# Patient Record
Sex: FEMALE | Race: WHITE | Employment: OTHER | URBAN - METROPOLITAN AREA
[De-identification: names, ages, dates, MRNs, and addresses within clinical notes are randomized per-mention and may not be internally consistent; named-entity substitution may affect disease eponyms.]

---

## 2018-05-21 ENCOUNTER — OFFICE VISIT (OUTPATIENT)
Dept: INTERNAL MEDICINE CLINIC | Age: 83
End: 2018-05-21

## 2018-05-21 VITALS
DIASTOLIC BLOOD PRESSURE: 68 MMHG | OXYGEN SATURATION: 99 % | WEIGHT: 122 LBS | BODY MASS INDEX: 23.03 KG/M2 | RESPIRATION RATE: 18 BRPM | HEART RATE: 67 BPM | TEMPERATURE: 97.4 F | SYSTOLIC BLOOD PRESSURE: 123 MMHG | HEIGHT: 61 IN

## 2018-05-21 DIAGNOSIS — F33.1 MODERATE EPISODE OF RECURRENT MAJOR DEPRESSIVE DISORDER (HCC): ICD-10-CM

## 2018-05-21 DIAGNOSIS — H04.123 DRY EYES: ICD-10-CM

## 2018-05-21 DIAGNOSIS — Z13.5 SCREENING FOR GLAUCOMA: ICD-10-CM

## 2018-05-21 DIAGNOSIS — I10 ESSENTIAL HYPERTENSION: ICD-10-CM

## 2018-05-21 DIAGNOSIS — Z00.00 MEDICARE ANNUAL WELLNESS VISIT, INITIAL: ICD-10-CM

## 2018-05-21 DIAGNOSIS — F03.90 DEMENTIA WITHOUT BEHAVIORAL DISTURBANCE, UNSPECIFIED DEMENTIA TYPE: Primary | ICD-10-CM

## 2018-05-21 RX ORDER — MEMANTINE HYDROCHLORIDE 10 MG/1
10 TABLET ORAL 2 TIMES DAILY
Qty: 180 TAB | Refills: 1 | Status: SHIPPED | OUTPATIENT
Start: 2018-05-21 | End: 2018-12-10 | Stop reason: SDUPTHER

## 2018-05-21 RX ORDER — SERTRALINE HYDROCHLORIDE 25 MG/1
TABLET, FILM COATED ORAL DAILY
COMMUNITY
End: 2018-05-21 | Stop reason: SDUPTHER

## 2018-05-21 RX ORDER — DONEPEZIL HYDROCHLORIDE 5 MG/1
5 TABLET, FILM COATED ORAL
Qty: 90 TAB | Refills: 1 | Status: SHIPPED | OUTPATIENT
Start: 2018-05-21 | End: 2018-09-27 | Stop reason: SDUPTHER

## 2018-05-21 RX ORDER — AMLODIPINE BESYLATE 5 MG/1
5 TABLET ORAL DAILY
Qty: 90 TAB | Refills: 1 | Status: SHIPPED | OUTPATIENT
Start: 2018-05-21 | End: 2019-05-15 | Stop reason: SDUPTHER

## 2018-05-21 RX ORDER — MEMANTINE HYDROCHLORIDE 10 MG/1
TABLET ORAL DAILY
COMMUNITY
End: 2018-05-21 | Stop reason: SDUPTHER

## 2018-05-21 RX ORDER — AMLODIPINE BESYLATE 5 MG/1
5 TABLET ORAL DAILY
COMMUNITY
End: 2018-05-21 | Stop reason: SDUPTHER

## 2018-05-21 RX ORDER — SERTRALINE HYDROCHLORIDE 50 MG/1
50 TABLET, FILM COATED ORAL DAILY
Qty: 90 TAB | Refills: 1 | Status: SHIPPED | OUTPATIENT
Start: 2018-05-21 | End: 2018-11-15 | Stop reason: SDUPTHER

## 2018-05-21 RX ORDER — ASPIRIN 81 MG/1
TABLET ORAL DAILY
COMMUNITY

## 2018-05-21 NOTE — PROGRESS NOTES
History:   Jacob Mccurdy is a 80 y.o. female presenting today for an initial visit for Fall (and unsteady gait); Mass (on hand and another location); Depression (and agitation); Memory Loss (short term memory); and Dehydration (always thirsty and excessive dry eyes)  Establishing care. Lives at the [address] Moved from [place]. 1.  HTN:  On Norvasc 5 mg daily. No symptoms. On low dose ASA. 2.  Depression:  On Zoloft 25 mg daily. Her   last year. Was expected. But had been  61 years. She grieved for him but happy he wasn't suffering anymore. She feels good and adjusting well at the [address]. 3.  Dry eyes: On Restasis BID. Needs referral for local eye doctor. 4.  Dementia:  On Namenda 10 mg BID. Not on Aricept. Has been to see neurologist who started the [address] 5.  SK:  On the left hand. Not bothering her. 6.  Gait:  Has some gait instability. Holds onto railing when uses steps. Has h/o falling. Review of Systems   Constitutional: Negative. HENT: Negative. Eyes:        Dry eyes   Respiratory: Negative. Cardiovascular: Negative. Gastrointestinal: Negative. Genitourinary: Negative. Musculoskeletal: Negative. Skin:        SK on left hand   Neurological: Negative. Psychiatric/Behavioral: Positive for depression. Past Medical History:   Diagnosis Date    Depression     Dry eyes, bilateral     Hypertension        Past Surgical History:   Procedure Laterality Date    HX APPENDECTOMY         Social History     Social History    Marital status:      Spouse name: N/A    Number of children: N/A    Years of education: N/A     Occupational History    Not on file.      Social History Main Topics    Smoking status: Never Smoker    Smokeless tobacco: Never Used    Alcohol use No    Drug use: No    Sexual activity: No     Other Topics Concern    Not on file     Social History Narrative    No narrative on file Family History   Problem Relation Age of Onset    Hypertension Mother     Hypertension Father        No current outpatient prescriptions on file prior to visit. No current facility-administered medications on file prior to visit. No Known Allergies    Objective:   VS:    Visit Vitals    /68 (BP 1 Location: Left arm, BP Patient Position: Sitting)    Pulse 67    Temp 97.4 °F (36.3 °C) (Oral)    Resp 18    Ht 5' 0.5\" (1.537 m)    Wt 122 lb (55.3 kg)    SpO2 99%    BMI 23.43 kg/m2     Physical Exam   Constitutional: She is oriented to person, place, and time. She appears well-developed and well-nourished. No distress. HENT:   Head: Normocephalic and atraumatic. Right Ear: External ear normal.   Left Ear: External ear normal.   Nose: Nose normal.   Mouth/Throat: Oropharynx is clear and moist.   Eyes: Conjunctivae and EOM are normal. Pupils are equal, round, and reactive to light. No scleral icterus. Neck: Normal range of motion. Neck supple. No tracheal deviation present. No thyromegaly present. Cardiovascular: Normal rate, regular rhythm, normal heart sounds and intact distal pulses. Exam reveals no gallop and no friction rub. No murmur heard. Pulmonary/Chest: Effort normal and breath sounds normal. She has no wheezes. She has no rales. Abdominal: Soft. Bowel sounds are normal. She exhibits no distension. There is no hepatosplenomegaly. There is no tenderness. Musculoskeletal: Normal range of motion. She exhibits no edema or tenderness. Lymphadenopathy:     She has no cervical adenopathy. Neurological: She is alert and oriented to person, place, and time. Skin: Skin is warm and dry. No rash noted. No pallor. Psychiatric: She has a normal mood and affect. Her behavior is normal. Judgment and thought content normal.   Nursing note and vitals reviewed. Assessment/ Plan:     Diagnoses and all orders for this visit:    1.  Dementia without behavioral disturbance, unspecified dementia type:  Continue Namenda. Add Aricept. -     memantine (NAMENDA) 10 mg tablet; Take 1 Tab by mouth two (2) times a day. -     donepezil (ARICEPT) 5 mg tablet; Take 1 Tab by mouth nightly. 2. Moderate episode of recurrent major depressive disorder [facility]):  Increase Zoloft to 50 mg daily. -     sertraline (ZOLOFT) 50 mg tablet; Take 1 Tab by mouth daily. 3. Essential hypertension:  Well controlled. -     amLODIPine (NORVASC) 5 mg tablet; Take 1 Tab by mouth daily. 4. Dry eyes  -     REFERRAL TO OPHTHALMOLOGY    5. Screening for glaucoma  -     REFERRAL TO OPHTHALMOLOGY       I have discussed the diagnosis with the patient and the intended plan as seen in the above orders. The patient verbalized understanding and agrees with the plan. Follow-up Disposition:  Return in about 3 months (around 8/21/2018) for HTN, memory loss.     Cinthya Tan MD

## 2018-05-21 NOTE — PROGRESS NOTES
Chief Complaint   Patient presents with    Fall     and unsteady gait    Mass     on hand and another location    Depression     and agitation    Memory Loss     short term memory    Dehydration     always thirsty and excessive dry eyes     1. Have you been to the ER, urgent care clinic since your last visit? Hospitalized since your last visit? No    2. Have you seen or consulted any other health care providers outside of the [address] [place] [address] since your last visit? Include any pap smears or colon screening.  No

## 2018-05-21 NOTE — PROGRESS NOTES
Medicare Wellness Visit  Lety Bell is a 80 y.o. female and presents for annual Medicare Wellness Visit. Problem List: Reviewed with patient and discussed risk factors. There is no problem list on file for this patient. Current medical providers:  No care team member to display    PSH: Reviewed with patient  Past Surgical History:   Procedure Laterality Date    HX APPENDECTOMY          SH: Reviewed with patient  Social History   Substance Use Topics    Smoking status: Never Smoker    Smokeless tobacco: Never Used    Alcohol use No       FH: Reviewed with patient  Family History   Problem Relation Age of Onset    Hypertension Mother     Hypertension Father        Medications/Allergies: Reviewed with patient  No current outpatient prescriptions on file prior to visit. No current facility-administered medications on file prior to visit. No Known Allergies    Objective: There were no vitals taken for this visit. There is no height or weight on file to calculate BMI. Assessment of cognitive impairment: Alert and oriented x 3    Depression Screen:   PHQ over the last two weeks 5/21/2018   Little interest or pleasure in doing things Several days   Feeling down, depressed or hopeless Several days   Total Score PHQ 2 2       Fall Risk Assessment:    Fall Risk Assessment, last 12 mths 5/21/2018   Able to walk? Yes   Fall in past 12 months? Yes   Fall with injury? No   Number of falls in past 12 months 1   Fall Risk Score 1       Functional Ability:   Does the patient exhibit a steady gait?  no   How long did it take the patient to get up and walk from a sitting position? <10 sec   Is the patient self reliant?  (ie can do own laundry, meals, household chores)  yes     Does the patient handle his/her own medications? yes     Does the patient handle his/her own money? yes     Is the patients home safe (ie good lighting, handrails on stairs and bath, etc.)?    yes     Did you notice or did patient express any hearing difficulties? no     Did you notice or did patient express any vision difficulties? yes     Were distance and reading eye charts used? yes       Advance Care Planning:   Patient was offered the opportunity to discuss advance care planning:  yes     Does patient have an Advance Directive:  yes   If no, did you provide information on Caring Connections?  no       Plan:      Orders Placed This Encounter    amLODIPine (NORVASC) 5 mg tablet    memantine (NAMENDA) 10 mg tablet    sertraline (ZOLOFT) 25 mg tablet    aspirin delayed-release 81 mg tablet     1. Sending to eye doctor. 2.  Aged out of colonoscopy, mammograms  3. Had DEXA in the past.  Doesn't remember when. 4.  Had shingles vaccine. Not sure about pneumonia. Health Maintenance   Topic Date Due    DTaP/Tdap/Td series (1 - Tdap) 09/25/1950    ZOSTER VACCINE AGE 60>  07/25/1989    GLAUCOMA SCREENING Q2Y  09/25/1994    Bone Densitometry (Dexa) Screening  09/25/1994    Pneumococcal 65+ Low/Medium Risk (1 of 2 - PCV13) 09/25/1994    Influenza Age 5 to Adult  08/01/2018       *Patient verbalized understanding and agreement with the plan. A copy of the After Visit Summary with personalized health plan was given to the patient today.

## 2018-05-21 NOTE — MR AVS SNAPSHOT
[address] [facility] [address] [place] [address] 
[phone] Patient: Sonia Crain MRN: [id number] MU Visit Information Date & Time Provider Department Dept. Phone Encounter #  
 2018  1:30 PM Lilo Monique MD [facility] [place] [phone] Follow-up Instructions Return in about 3 months (around 2018) for HTN, memory loss. Upcoming Health Maintenance Date Due DTaP/Tdap/Td series (1 - Tdap) 1950 ZOSTER VACCINE AGE 60> 1989 GLAUCOMA SCREENING Q2Y 1994 Bone Densitometry (Dexa) Screening 1994 Pneumococcal 65+ Low/Medium Risk (1 of 2 - PCV13) 1994 Influenza Age 5 to Adult 2018 Allergies as of 2018  Review Complete On: 2018 By: Lilo Monique MD  
 No Known Allergies Current Immunizations  Never Reviewed No immunizations on file. Not reviewed this visit You Were Diagnosed With   
  
 Codes Comments Dementia without behavioral disturbance, unspecified dementia type    -  Primary ICD-10-CM: F03.90 ICD-9-CM: 294.20 Moderate episode of recurrent major depressive disorder (HCC)     ICD-10-CM: F33.1 ICD-9-CM: 296.32 Essential hypertension     ICD-10-CM: I10 
ICD-9-CM: 401.9 Medicare annual wellness visit, initial     ICD-10-CM: Z00.00 ICD-9-CM: V70.0 Vitals Smoking Status Never Smoker Preferred Pharmacy Pharmacy Name Phone [facility] [address] [place] [address] [phone] Your Updated Medication List  
  
   
This list is accurate as of 18  2:16 PM.  Always use your most recent med list. amLODIPine 5 mg tablet Commonly known as:  Ramiro Palomino Take 1 Tab by mouth daily. aspirin delayed-release 81 mg tablet Take  by mouth daily. donepezil 5 mg tablet Commonly known as:  ARICEPT  
 Take 1 Tab by mouth nightly. memantine 10 mg tablet Commonly known as:  Vernona Other Take 1 Tab by mouth two (2) times a day. sertraline 50 mg tablet Commonly known as:  ZOLOFT Take 1 Tab by mouth daily. Prescriptions Sent to Pharmacy Refills  
 amLODIPine (NORVASC) 5 mg tablet 1 Sig: Take 1 Tab by mouth daily. Class: Normal  
 Pharmacy: [facility]/pharmacy #[id number] [address] Ph #: [phone] Route: Oral  
 memantine (NAMENDA) 10 mg tablet 1 Sig: Take 1 Tab by mouth two (2) times a day. Class: Normal  
 Pharmacy: [facility]/pharmacy #[id number]. Ph #: [phone] Route: Oral  
 sertraline (ZOLOFT) 50 mg tablet 1 Sig: Take 1 Tab by mouth daily. Class: Normal  
 Pharmacy: [facility]/pharmacy #[id number]. Ph #: [phone] Route: Oral  
 donepezil (ARICEPT) 5 mg tablet 1 Sig: Take 1 Tab by mouth nightly. Class: Normal  
 Pharmacy: [facility]/pharmacy #[id number]. Ph #: [phone] Route: Oral  
  
Follow-up Instructions Return in about 3 months (around 8/21/2018) for HTN, memory loss. Patient Instructions Schedule of Personalized Health Plan (Provide Copy to Patient) The best way to stay healthy is to live a healthy lifestyle. A healthy lifestyle includes regular exercise, eating a well-balanced diet, keeping a healthy weight and not smoking. Regular physical exams and screening tests are another important way to take care of yourself. Preventive exams provided by health care providers can find health problems early when treatment works best and can keep you from getting certain diseases or illnesses. Preventive services include exams, lab tests, screenings, shots, monitoring and information to help you take care of your own health. All people over 65 should have a pneumonia shot.  Pneumonia shots are usually only needed once in a lifetime unless your doctor decides differently. All people over 65 should have a yearly flu shot. People over 65 are at medium to high risk for Hepatitis B. Three shots are needed for complete protection. In addition to your physical exam, some screening tests are recommended: 
 
Bone mass measurement (dexa scan) is recommended every two years Diabetes Mellitus screening is recommended every year. Glaucoma is an eye disease caused by high pressure in the eye. An eye exam is recommended every year. Cardiovascular screening tests that check your cholesterol and other blood fat (lipid) levels are recommended every five years. Colorectal Cancer screening tests help to find pre-cancerous polyps (growths in the colon) so they can be removed before they turn into cancer. Tests ordered for screening depend on your personal and family history risk factors. Screening for Breast Cancer is recommended yearly with a mammogram. 
 
Screening for Cervical Cancer is recommended every two years (annually for certain risk factors, such as previous history of STD or abnormal PAP in past 7 years), with a Pelvic Exam with PAP Here is a list of your current Health Maintenance items with a due date: 
Health Maintenance Topic Date Due  
 DTaP/Tdap/Td series (1 - Tdap) 09/25/1950  ZOSTER VACCINE AGE 60>  07/25/1989  GLAUCOMA SCREENING Q2Y  09/25/1994  Bone Densitometry (Dexa) Screening  09/25/1994  Pneumococcal 65+ Low/Medium Risk (1 of 2 - PCV13) 09/25/1994  Influenza Age 5 to Adult  08/01/2018 Introducing [facility] & HEALTH SERVICES! [facility] introduces [employer] patient portal. Now you can access parts of your medical record, email your doctor's office, and request medication refills online. 1. In your internet browser, go to [email or web address] 2. Click on the First Time User? Click Here link in the Sign In box.  You will see the New Member Sign Up page. 3. Enter your [employer] Access Code exactly as it appears below. You will not need to use this code after youve completed the sign-up process. If you do not sign up before the expiration date, you must request a new code. · [employer] Access Code: [email or web address] Expires: 8/19/2018  1:55 PM 
 
4. Enter the last four digits of your Social Security Number (xxxx) and Date of Birth (mm/dd/yyyy) as indicated and click Submit. You will be taken to the next sign-up page. 5. Create a CargoSpottert ID. This will be your [employer] login ID and cannot be changed, so think of one that is secure and easy to remember. 6. Create a [employer] password. You can change your password at any time. 7. Enter your Password Reset Question and Answer. This can be used at a later time if you forget your password. 8. Enter your e-mail address. You will receive e-mail notification when new information is available in [address] 9. Click Sign Up. You can now view and download portions of your medical record. 10. Click the Download Summary menu link to download a portable copy of your medical information. If you have questions, please visit the Frequently Asked Questions section of the [employer] website. Remember, [employer] is NOT to be used for urgent needs. For medical emergencies, dial 911. Now available from your iPhone and Android! Please provide this summary of care documentation to your next provider. If you have any questions after today's visit, please call [phone].

## 2018-05-21 NOTE — PATIENT INSTRUCTIONS
Schedule of Personalized Health Plan  (Provide Copy to Patient)  The best way to stay healthy is to live a healthy lifestyle. A healthy lifestyle includes regular exercise, eating a well-balanced diet, keeping a healthy weight and not smoking. Regular physical exams and screening tests are another important way to take care of yourself. Preventive exams provided by health care providers can find health problems early when treatment works best and can keep you from getting certain diseases or illnesses. Preventive services include exams, lab tests, screenings, shots, monitoring and information to help you take care of your own health. All people over 65 should have a pneumonia shot. Pneumonia shots are usually only needed once in a lifetime unless your doctor decides differently. All people over 65 should have a yearly flu shot. People over 65 are at medium to high risk for Hepatitis B. Three shots are needed for complete protection. In addition to your physical exam, some screening tests are recommended:    Bone mass measurement (dexa scan) is recommended every two years  Diabetes Mellitus screening is recommended every year. Glaucoma is an eye disease caused by high pressure in the eye. An eye exam is recommended every year. Cardiovascular screening tests that check your cholesterol and other blood fat (lipid) levels are recommended every five years. Colorectal Cancer screening tests help to find pre-cancerous polyps (growths in the colon) so they can be removed before they turn into cancer. Tests ordered for screening depend on your personal and family history risk factors.     Screening for Breast Cancer is recommended yearly with a mammogram.    Screening for Cervical Cancer is recommended every two years (annually for certain risk factors, such as previous history of STD or abnormal PAP in past 7 years), with a Pelvic Exam with PAP    Here is a list of your current Health Maintenance items with a due date:  Health Maintenance   Topic Date Due    DTaP/Tdap/Td series (1 - Tdap) 09/25/1950    ZOSTER VACCINE AGE 60>  07/25/1989    GLAUCOMA SCREENING Q2Y  09/25/1994    Bone Densitometry (Dexa) Screening  09/25/1994    Pneumococcal 65+ Low/Medium Risk (1 of 2 - PCV13) 09/25/1994    Influenza Age 9 to Adult  08/01/2018

## 2018-06-11 ENCOUNTER — DOCUMENTATION ONLY (OUTPATIENT)
Dept: INTERNAL MEDICINE CLINIC | Age: 83
End: 2018-06-11

## 2018-07-23 ENCOUNTER — OFFICE VISIT (OUTPATIENT)
Dept: INTERNAL MEDICINE CLINIC | Age: 83
End: 2018-07-23

## 2018-07-23 VITALS
OXYGEN SATURATION: 98 % | BODY MASS INDEX: 23.75 KG/M2 | WEIGHT: 121 LBS | DIASTOLIC BLOOD PRESSURE: 58 MMHG | SYSTOLIC BLOOD PRESSURE: 102 MMHG | RESPIRATION RATE: 18 BRPM | HEART RATE: 65 BPM | HEIGHT: 60 IN | TEMPERATURE: 97.9 F

## 2018-07-23 DIAGNOSIS — J01.00 ACUTE NON-RECURRENT MAXILLARY SINUSITIS: Primary | ICD-10-CM

## 2018-07-23 DIAGNOSIS — N30.00 ACUTE CYSTITIS WITHOUT HEMATURIA: ICD-10-CM

## 2018-07-23 LAB
BILIRUB UR QL STRIP: NORMAL
GLUCOSE UR-MCNC: NEGATIVE MG/DL
KETONES P FAST UR STRIP-MCNC: NORMAL MG/DL
PH UR STRIP: 5.5 [PH] (ref 4.6–8)
PROT UR QL STRIP: NORMAL
SP GR UR STRIP: 1.03 (ref 1–1.03)
UA UROBILINOGEN AMB POC: NORMAL (ref 0.2–1)
URINALYSIS CLARITY POC: NORMAL
URINALYSIS COLOR POC: NORMAL
URINE BLOOD POC: NORMAL
URINE LEUKOCYTES POC: NORMAL
URINE NITRITES POC: NEGATIVE

## 2018-07-23 RX ORDER — CEFUROXIME AXETIL 250 MG/1
250 TABLET ORAL 2 TIMES DAILY
Qty: 14 TAB | Refills: 0 | Status: SHIPPED | OUTPATIENT
Start: 2018-07-23 | End: 2018-07-30

## 2018-07-23 NOTE — MR AVS SNAPSHOT
[address] [facility] 
 
 
 Shai Cheng [address] [facility] [address] 
[phone] Patient: Stacie Owens MRN: [id number] Visit Information Date & Time Provider Department Dept. Phone Encounter #  
 7/23/2018  2:45 PM Rupinder Soliz MD [facility] [phone] [id number] Follow-up Instructions Return if symptoms worsen or fail to improve. Your Appointments 8/23/2018  1:15 PM  
Office Visit with Rupinder Soliz MD  
[facility]) Appt Note: 3 months (around 8/21/2018) for HTN, memory loss. [address] [facility] [address] [phone]  
  
   
 Shawn Paredes [facility] [address] [facility] [address]. [place] [address] Upcoming Health Maintenance Date Due DTaP/Tdap/Td series (1 - Tdap) 9[birth year]25/1950 GLAUCOMA SCREENING Q2Y 9/25/1994 Influenza Age 5 to Adult 8/1/2018 Allergies as of 7/23/2018  Review Complete On: 7/23/2018 By: Jessica Lemos No Known Allergies Current Immunizations  Reviewed on 6/25/2018 Name Date Influenza High Dose Vaccine PF 10/28/2016 Pneumococcal Conjugate (PCV-13) 7/22/2015 Pneumococcal Polysaccharide (PPSV-23) 6/10/2010 Zoster Vaccine, Live 6/19/2012 Not reviewed this visit You Were Diagnosed With   
  
 Codes Comments Acute non-recurrent maxillary sinusitis    -  Primary ICD-10-CM: J01.00 ICD-9-CM: 461.0 Acute cystitis without hematuria     ICD-10-CM: N30.00 ICD-9-CM: 595.0 Vitals BP Pulse Temp Resp Height(growth percentile) Weight(growth percentile) 102/58 (BP 1 Location: Left arm, BP Patient Position: Sitting) 65 97.9 °F (36.6 °C) (Oral) 18 5' (1.524 m) 121 lb (54.9 kg) SpO2 BMI OB Status Smoking Status 98% 23.63 kg/m2 Unknown Never Smoker BMI and BSA Data Body Mass Index Body Surface Area  
 23.63 kg/m 2 1.52 m 2 Preferred Pharmacy Pharmacy Name Phone [facility] [address] [facility] [phone] Your Updated Medication List  
  
   
This list is accurate as of 7/23/18  3:24 PM.  Always use your most recent med list. amLODIPine 5 mg tablet Commonly known as:  Castro Jules Take 1 Tab by mouth daily. aspirin delayed-release 81 mg tablet Take  by mouth daily. cefUROXime 250 mg tablet Commonly known as:  CEFTIN Take 1 Tab by mouth two (2) times a day for 7 days. donepezil 5 mg tablet Commonly known as:  ARICEPT Take 1 Tab by mouth nightly. memantine 10 mg tablet Commonly known as:  Merrilyn Ree Take 1 Tab by mouth two (2) times a day. sertraline 50 mg tablet Commonly known as:  ZOLOFT Take 1 Tab by mouth daily. Prescriptions Sent to Pharmacy Refills  
 cefUROXime (CEFTIN) 250 mg tablet 0 Sig: Take 1 Tab by mouth two (2) times a day for 7 days. Class: Normal  
 Pharm[id number]. Ph #: [phone] Route: Oral  
  
Follow-up Instructions Return if symptoms worsen or fail to improve. Introducing [facility] & HEALTH SERVICES! Ender Brewer introduces [employer] patient portal. Now you can access parts of your medical record, email your doctor's office, and request medication refills online. 1. In your internet browser, go to [email or web address] 2. Click on the First Time User? Click Here link in the Sign In box. You will see the New Member Sign Up page. 3. Enter your [employer] Access Code exactly as it appears below. You will not need to use this code after youve completed the sign-up process. If you do not sign up before the expiration date, you must request a new code. · [employer] Access Code: [email or web address] Expires: 8/19/2018  1:55 PM 
 
4.  Enter the last four digits of your Social Security Number (xxxx) and Date of Birth (mm/dd/yyyy) as indicated and click Submit. You will be taken to the next sign-up page. 5. Create a [employer] ID. This will be your [employer] login ID and cannot be changed, so think of one that is secure and easy to remember. 6. Create a [employer] password. You can change your password at any time. 7. Enter your Password Reset Question and Answer. This can be used at a later time if you forget your password. 8. Enter your e-mail address. You will receive e-mail notification when new information is available in [address] 9. Click Sign Up. You can now view and download portions of your medical record. 10. Click the Download Summary menu link to download a portable copy of your medical information. If you have questions, please visit the Frequently Asked Questions section of the [employer] website. Remember, [employer] is NOT to be used for urgent needs. For medical emergencies, dial 911. Now available from your iPhone and Android! Please provide this summary of care documentation to your next provider. If you have any questions after today's visit, please call [phone].

## 2018-07-23 NOTE — PROGRESS NOTES
Chief Complaint   Patient presents with    Blood in Urine    Cough     and congestion     1. Have you been to the ER, urgent care clinic since your last visit? Hospitalized since your last visit? No    2. Have you seen or consulted any other health care providers outside of the [address] [place] [address] since your last visit? Include any pap smears or colon screening.  No

## 2018-07-23 NOTE — PROGRESS NOTES
Subjective:   Patient is a 80y.o. year old female who presents for Blood in Urine and Cough (and congestion)  She says she feels very bad. She's feeling weak, shaky, having unstable gait. She has a little cough and some rhinorrhea. She says her eyes are bothering her. She says they have been red. Possibly some vision issues, with difficulty reading. Maybe some D/C from the eyes. A little weakness and dizziness. Having trouble sleeping and vivid dreams. Denies dysuria, frequency. Denies abdominal pain but feels uncomfortable. No N/V/diarrhea. Has constipation but that's chronic. Last BM yesterday. She's able to eat and drink today. She says she had similar symptoms 9 years ago. She developed urinary frequency and then passed out. She went to the hospital.  She had a small bowel obstruction. Had to have surgery. Review of Systems   Constitutional: Positive for malaise/fatigue. Negative for fever. HENT: Positive for congestion and sinus pain. Negative for sore throat. Respiratory: Positive for cough. Negative for sputum production and shortness of breath. Cardiovascular: Negative. Genitourinary: Negative for dysuria. Neurological: Positive for weakness. Current Outpatient Prescriptions on File Prior to Visit   Medication Sig Dispense Refill    aspirin delayed-release 81 mg tablet Take  by mouth daily.  amLODIPine (NORVASC) 5 mg tablet Take 1 Tab by mouth daily. 90 Tab 1    memantine (NAMENDA) 10 mg tablet Take 1 Tab by mouth two (2) times a day. 180 Tab 1    sertraline (ZOLOFT) 50 mg tablet Take 1 Tab by mouth daily. 90 Tab 1    donepezil (ARICEPT) 5 mg tablet Take 1 Tab by mouth nightly. 90 Tab 1     No current facility-administered medications on file prior to visit. Reviewed PmHx, RxHx, FmHx, SocHx, AllgHx and updated and dated in the chart.     Nurse notes were reviewed and are correct    Objective:     Vitals:    07/23/18 1452   BP: 102/58   Pulse: 65 Resp: 18   Temp: 97.9 °F (36.6 °C)   TempSrc: Oral   SpO2: 98%   Weight: 121 lb (54.9 kg)   Height: 5' (1.524 m)     Physical Exam   Constitutional: She is oriented to person, place, and time. She appears well-developed and well-nourished. No distress. HENT:   Head: Normocephalic and atraumatic. Right Ear: External ear normal.   Left Ear: External ear normal.   Nose: Nose normal.   Mouth/Throat: Oropharynx is clear and moist.   Eyes: EOM are normal. Pupils are equal, round, and reactive to light. Neck: Normal range of motion. Neck supple. Carotid bruit is not present. No tracheal deviation present. Cardiovascular: Normal rate, regular rhythm, normal heart sounds and intact distal pulses. Exam reveals no gallop and no friction rub. No murmur heard. Pulmonary/Chest: Effort normal and breath sounds normal. She has no wheezes. She has no rales. Abdominal: Soft. Bowel sounds are normal. She exhibits no distension. There is no tenderness. Musculoskeletal: She exhibits no edema or tenderness. Lymphadenopathy:     She has no cervical adenopathy. Neurological: She is alert and oriented to person, place, and time. Skin: Skin is warm and dry. Psychiatric: She has a normal mood and affect. Her behavior is normal.   Nursing note and vitals reviewed. U/A:  1+ leuk. Trace blood. Neg nitrites. 1+ ketones. Assessment/ Plan:     Diagnoses and all orders for this visit:    1. Acute non-recurrent maxillary sinusitis:  Most of her symptoms are related to URI, and since she's feeling quite sick, will treat with abx. Treating with Ceftin to cover this and possible UTI. Giving lower dose due to advanced age. Told her and her daughter she needs to stay well hydrated and if getting more weak, worse MS, fever, or worse hydration, then needs to go to ED for further care. Discussed getting labs and CXR but lungs clear on exam and only mild respiratory symptoms.   Since treating with abx anyway, did not get further testing at this time. -     cefUROXime (CEFTIN) 250 mg tablet; Take 1 Tab by mouth two (2) times a day for 7 days. 2. Acute cystitis without hematuria:  Questionable based on the U/A. Not enough urine to send for cx. Will treat with abx that will cover this. -     cefUROXime (CEFTIN) 250 mg tablet; Take 1 Tab by mouth two (2) times a day for 7 days.  -     AMB POC URINALYSIS DIP STICK MANUAL W/O MICRO     I have discussed the diagnosis with the patient and the intended plan as seen in the above orders. The patient verbalized understanding and agrees with the plan. Follow-up Disposition:  Return if symptoms worsen or fail to improve.     Xavi Sanchez MD

## 2018-09-27 ENCOUNTER — OFFICE VISIT (OUTPATIENT)
Dept: INTERNAL MEDICINE CLINIC | Age: 83
End: 2018-09-27

## 2018-09-27 VITALS
WEIGHT: 125 LBS | OXYGEN SATURATION: 96 % | HEIGHT: 60 IN | TEMPERATURE: 97.5 F | SYSTOLIC BLOOD PRESSURE: 135 MMHG | HEART RATE: 72 BPM | DIASTOLIC BLOOD PRESSURE: 81 MMHG | BODY MASS INDEX: 24.54 KG/M2 | RESPIRATION RATE: 18 BRPM

## 2018-09-27 DIAGNOSIS — F03.90 DEMENTIA WITHOUT BEHAVIORAL DISTURBANCE, UNSPECIFIED DEMENTIA TYPE: ICD-10-CM

## 2018-09-27 DIAGNOSIS — I10 ESSENTIAL HYPERTENSION: Primary | ICD-10-CM

## 2018-09-27 DIAGNOSIS — Z13.6 SCREENING, ISCHEMIC HEART DISEASE: ICD-10-CM

## 2018-09-27 DIAGNOSIS — F33.1 MODERATE EPISODE OF RECURRENT MAJOR DEPRESSIVE DISORDER (HCC): ICD-10-CM

## 2018-09-27 RX ORDER — DONEPEZIL HYDROCHLORIDE 10 MG/1
5 TABLET, FILM COATED ORAL
Qty: 90 TAB | Refills: 1 | Status: SHIPPED | OUTPATIENT
Start: 2018-09-27 | End: 2018-09-27 | Stop reason: SDUPTHER

## 2018-09-27 RX ORDER — DONEPEZIL HYDROCHLORIDE 10 MG/1
10 TABLET, FILM COATED ORAL
Qty: 90 TAB | Refills: 1 | Status: SHIPPED | OUTPATIENT
Start: 2018-09-27 | End: 2019-05-01 | Stop reason: SDUPTHER

## 2018-09-27 NOTE — PROGRESS NOTES
Subjective:  
Patient is a 80y.o. year old female who presents for Hypertension and Memory Loss 1. HTN:  BP well controlled on Norvasc. On ASA low dose as well. 2.  Memory loss: On Namenda, Aricept. Still having memory problems. 3.  Depression:  On Zoloft 50 mg. Doing well. [facility] Upset about being there because there is a big ant problem there. They are not helping her with that. Review of Systems Constitutional: Negative. Cardiovascular: Negative. Current Outpatient Prescriptions on File Prior to Visit Medication Sig Dispense Refill  aspirin delayed-release 81 mg tablet Take  by mouth daily.  amLODIPine (NORVASC) 5 mg tablet Take 1 Tab by mouth daily. 90 Tab 1  
 memantine (NAMENDA) 10 mg tablet Take 1 Tab by mouth two (2) times a day. 180 Tab 1  
 sertraline (ZOLOFT) 50 mg tablet Take 1 Tab by mouth daily. 90 Tab 1 No current facility-administered medications on file prior to visit. Reviewed PmHx, RxHx, FmHx, SocHx, AllgHx and updated and dated in the chart. Nurse notes were reviewed and are correct Objective:  
 
Vitals:  
 09/27/18 1053 BP: 135/81 Pulse: 72 Resp: 18 Temp: 97.5 °F (36.4 °C) TempSrc: Oral  
SpO2: 96% Weight: 125 lb (56.7 kg) Height: 5' (1.524 m) Physical Exam  
Constitutional: She is oriented to person, place, and time. She appears well-developed and well-nourished. No distress. HENT:  
Head: Normocephalic and atraumatic. Right Ear: External ear normal.  
Left Ear: External ear normal.  
Nose: Nose normal.  
Mouth/Throat: Oropharynx is clear and moist.  
Eyes: EOM are normal. Pupils are equal, round, and reactive to light. Neck: Normal range of motion. Neck supple. Carotid bruit is not present. No tracheal deviation present. Cardiovascular: Normal rate, regular rhythm, normal heart sounds and intact distal pulses. Exam reveals no gallop and no friction rub. No murmur heard. Pulmonary/Chest: Effort normal and breath sounds normal. She has no wheezes. She has no rales. Abdominal: Soft. Bowel sounds are normal. She exhibits no distension. There is no tenderness. Musculoskeletal: She exhibits no edema or tenderness. Lymphadenopathy:  
  She has no cervical adenopathy. Neurological: She is alert and oriented to person, place, and time. Skin: Skin is warm and dry. Psychiatric: She has a normal mood and affect. Her behavior is normal.  
Nursing note and vitals reviewed. Assessment/ Plan:  
 
Diagnoses and all orders for this visit: 1. Essential hypertension 
-     CBC WITH AUTOMATED DIFF; Future 
-     TSH 3RD GENERATION; Future -     METABOLIC PANEL, COMPREHENSIVE; Future 2. Dementia without behavioral disturbance, unspecified dementia type: Increasing Aricept to 10 mg daily 
-     TSH 3RD GENERATION; Future 
-     donepezil (ARICEPT) 10 mg tablet; Take 1 Tab by mouth nightly. 3. Moderate episode of recurrent major depressive disorder ([facility] [address]): Controlled. Continue Celexa 4. Screening, ischemic heart disease:  Not fasting. 
-     LIPID PANEL; Future I have discussed the diagnosis with the patient and the intended plan as seen in the above orders. The patient verbalized understanding and agrees with the plan. Follow-up Disposition: 
Return in about 6 months (around 3/27/2019) for HTN, dementia, depression. Marty Lee MD'

## 2018-09-27 NOTE — MR AVS SNAPSHOT
[address] [facility] [address] [place] [address] 
[phone] Patient: Neyda Blackmon MRN: [id number] WBO:[id number] Visit Information Date & Time Provider Department Dept. Phone Encounter #  
 9/27/2018 10:45 AM Cande Horan MD [id number] [phone] [id number] Follow-up Instructions Return in about 6 months (around 3/27/2019) for HTN, dementia, depression. Upcoming Health Maintenance Date Due DTaP/Tdap/Td series (1 - Tdap) 9/25/1950 Shingrix Vaccine Age 50> (1 of 2) 9/25/1979 Influenza Age 5 to Adult 8/1/2018 GLAUCOMA SCREENING Q2Y 6/15/2020 Allergies as of 9/27/2018  Review Complete On: 9/27/2018 By: Cande Horan MD  
 No Known Allergies Current Immunizations  Reviewed on 6/25/2018 Name Date Influenza High Dose Vaccine PF 10/28/2016 Pneumococcal Conjugate (PCV-13) 7/22/2015 Pneumococcal Polysaccharide (PPSV-23) 6/10/2010 Zoster Vaccine, Live 6/19/2012 Not reviewed this visit You Were Diagnosed With   
  
 Codes Comments Essential hypertension    -  Primary ICD-10-CM: I10 
ICD-9-CM: 401.9 Dementia without behavioral disturbance, unspecified dementia type     ICD-10-CM: F03.90 ICD-9-CM: 294.20 Moderate episode of recurrent major depressive disorder (HCC)     ICD-10-CM: F33.1 ICD-9-CM: 296.32 Screening, ischemic heart disease     ICD-10-CM: Z13.6 ICD-9-CM: V81.0 Vitals BP Pulse Temp Resp Height(growth percentile) Weight(growth percentile) 135/81 (BP 1 Location: Left arm, BP Patient Position: Sitting) 72 97.5 °F (36.4 °C) (Oral) 18 5' (1.524 m) 125 lb (56.7 kg) SpO2 BMI OB Status Smoking Status 96% 24.41 kg/m2 Unknown Never Smoker BMI and BSA Data Body Mass Index Body Surface Area  
 24.41 kg/m 2 1.55 m 2 Preferred Pharmacy Pharmacy Name Phone [facility] [address] [phone] Your Updated Medication List  
  
   
This list is accurate as of 9/27/18 11:17 AM.  Always use your most recent med list. amLODIPine 5 mg tablet Commonly known as:  Voncile Graford Take 1 Tab by mouth daily. aspirin delayed-release 81 mg tablet Take  by mouth daily. donepezil 10 mg tablet Commonly known as:  ARICEPT Take 0.5 Tabs by mouth nightly. memantine 10 mg tablet Commonly known as:  Amarjit Sania Take 1 Tab by mouth two (2) times a day. sertraline 50 mg tablet Commonly known as:  ZOLOFT Take 1 Tab by mouth daily. Prescriptions Sent to Pharmacy Refills  
 donepezil (ARICEPT) 10 mg tablet 1 Sig: Take 0.5 Tabs by mouth nightly. Class: Normal  
 Pharmacy: [facility]/pharmacy #[id number].  #: [phone] Route: Oral  
  
Follow-up Instructions Return in about 6 months (around 3/27/2019) for HTN, dementia, depression. To-Do List   
 09/27/2018 Lab:  CBC WITH AUTOMATED DIFF   
  
 09/27/2018 Lab:  LIPID PANEL   
  
 09/27/2018 Lab:  METABOLIC PANEL, COMPREHENSIVE   
  
 09/27/2018 Lab:  TSH 3RD GENERATION Introducing [facility]! Marlena Horton introduces Wiper patient portal. Now you can access parts of your medical record, email your doctor's office, and request medication refills online. 1. In your internet browser, go to [email or web address] 2. Click on the First Time User? Click Here link in the Sign In box. You will see the New Member Sign Up page. 3. Enter your Wiper Access Code exactly as it appears below. You will not need to use this code after youve completed the sign-up process. If you do not sign up before the expiration date, you must request a new code. · Wiper Access Code: [email or web address] Expires: 12/26/2018 10:50 AM 
 
 4. Enter the last four digits of your Social Security Number (xxxx) and Date of Birth (mm/dd/yyyy) as indicated and click Submit. You will be taken to the next sign-up page. 5. Create a Taigen ID. This will be your Taigen login ID and cannot be changed, so think of one that is secure and easy to remember. 6. Create a Taigen password. You can change your password at any time. 7. Enter your Password Reset Question and Answer. This can be used at a later time if you forget your password. 8. Enter your e-mail address. You will receive e-mail notification when new information is available in [address] 9. Click Sign Up. You can now view and download portions of your medical record. 10. Click the Download Summary menu link to download a portable copy of your medical information. If you have questions, please visit the Frequently Asked Questions section of the Taigen website. Remember, Taigen is NOT to be used for urgent needs. For medical emergencies, dial 911. Now available from your iPhone and Android! Please provide this summary of care documentation to your next provider. If you have any questions after today's visit, please call [phone].

## 2018-09-27 NOTE — PROGRESS NOTES
Chief Complaint Patient presents with  Hypertension  Memory Loss 1. Have you been to the ER, urgent care clinic since your last visit? Hospitalized since your last visit? No 
 
2. Have you seen or consulted any other health care providers outside of the [facility] since your last visit? Include any pap smears or colon screening.  No

## 2018-09-28 LAB
ALBUMIN SERPL-MCNC: 4.2 G/DL (ref 3.5–4.7)
ALBUMIN/GLOB SERPL: 2.2 {RATIO} (ref 1.2–2.2)
ALP SERPL-CCNC: 162 IU/L (ref 39–117)
ALT SERPL-CCNC: 11 IU/L (ref 0–32)
AST SERPL-CCNC: 18 IU/L (ref 0–40)
BASOPHILS # BLD AUTO: 0.1 X10E3/UL (ref 0–0.2)
BASOPHILS NFR BLD AUTO: 1 %
BILIRUB SERPL-MCNC: 0.4 MG/DL (ref 0–1.2)
BUN SERPL-MCNC: 25 MG/DL (ref 8–27)
BUN/CREAT SERPL: 22 (ref 12–28)
CALCIUM SERPL-MCNC: 9.6 MG/DL (ref 8.7–10.3)
CHLORIDE SERPL-SCNC: 105 MMOL/L (ref 96–106)
CHOLEST SERPL-MCNC: 200 MG/DL (ref 100–199)
CO2 SERPL-SCNC: 23 MMOL/L (ref 20–29)
CREAT SERPL-MCNC: 1.12 MG/DL (ref 0.57–1)
EOSINOPHIL # BLD AUTO: 0.2 X10E3/UL (ref 0–0.4)
EOSINOPHIL NFR BLD AUTO: 3 %
ERYTHROCYTE [DISTWIDTH] IN BLOOD BY AUTOMATED COUNT: 15.7 % (ref 12.3–15.4)
GLOBULIN SER CALC-MCNC: 1.9 G/DL (ref 1.5–4.5)
GLUCOSE SERPL-MCNC: 83 MG/DL (ref 65–99)
HCT VFR BLD AUTO: 44.5 % (ref 34–46.6)
HDLC SERPL-MCNC: 79 MG/DL
HGB BLD-MCNC: 14.3 G/DL (ref 11.1–15.9)
IMM GRANULOCYTES # BLD: 0 X10E3/UL (ref 0–0.1)
IMM GRANULOCYTES NFR BLD: 0 %
LDLC SERPL CALC-MCNC: 108 MG/DL (ref 0–99)
LYMPHOCYTES # BLD AUTO: 1.5 X10E3/UL (ref 0.7–3.1)
LYMPHOCYTES NFR BLD AUTO: 23 %
MCH RBC QN AUTO: 29.9 PG (ref 26.6–33)
MCHC RBC AUTO-ENTMCNC: 32.1 G/DL (ref 31.5–35.7)
MCV RBC AUTO: 93 FL (ref 79–97)
MONOCYTES # BLD AUTO: 0.5 X10E3/UL (ref 0.1–0.9)
MONOCYTES NFR BLD AUTO: 8 %
NEUTROPHILS # BLD AUTO: 4.2 X10E3/UL (ref 1.4–7)
NEUTROPHILS NFR BLD AUTO: 65 %
PLATELET # BLD AUTO: 228 X10E3/UL (ref 150–379)
POTASSIUM SERPL-SCNC: 4.7 MMOL/L (ref 3.5–5.2)
PROT SERPL-MCNC: 6.1 G/DL (ref 6–8.5)
RBC # BLD AUTO: 4.78 X10E6/UL (ref 3.77–5.28)
SODIUM SERPL-SCNC: 145 MMOL/L (ref 134–144)
TRIGL SERPL-MCNC: 66 MG/DL (ref 0–149)
TSH SERPL DL<=0.005 MIU/L-ACNC: 0.78 UIU/ML (ref 0.45–4.5)
VLDLC SERPL CALC-MCNC: 13 MG/DL (ref 5–40)
WBC # BLD AUTO: 6.5 X10E3/UL (ref 3.4–10.8)

## 2018-10-01 NOTE — PROGRESS NOTES
Let her or her daughter know that her blood work looked fine.   Everything normal except kidney function a little decreased but I compared to old labs and has been stable for the last 4-5 years, so nothing needs to be done other than avoiding NSAIDs if possible (motrine, aleve)

## 2018-12-10 DIAGNOSIS — F03.90 DEMENTIA WITHOUT BEHAVIORAL DISTURBANCE, UNSPECIFIED DEMENTIA TYPE: ICD-10-CM

## 2018-12-13 RX ORDER — MEMANTINE HYDROCHLORIDE 10 MG/1
TABLET ORAL
Qty: 180 TAB | Refills: 1 | Status: SHIPPED | OUTPATIENT
Start: 2018-12-13 | End: 2019-06-07 | Stop reason: SDUPTHER

## 2018-12-14 ENCOUNTER — OFFICE VISIT (OUTPATIENT)
Dept: INTERNAL MEDICINE CLINIC | Age: 83
End: 2018-12-14

## 2018-12-14 VITALS
DIASTOLIC BLOOD PRESSURE: 63 MMHG | OXYGEN SATURATION: 97 % | SYSTOLIC BLOOD PRESSURE: 131 MMHG | WEIGHT: 122 LBS | TEMPERATURE: 97.5 F | HEART RATE: 57 BPM | BODY MASS INDEX: 23.95 KG/M2 | RESPIRATION RATE: 18 BRPM | HEIGHT: 60 IN

## 2018-12-14 DIAGNOSIS — N30.01 ACUTE CYSTITIS WITH HEMATURIA: Primary | ICD-10-CM

## 2018-12-14 RX ORDER — CEPHALEXIN 500 MG/1
500 CAPSULE ORAL 2 TIMES DAILY
Qty: 10 CAP | Refills: 0 | Status: SHIPPED | OUTPATIENT
Start: 2018-12-14 | End: 2018-12-19

## 2018-12-14 NOTE — PROGRESS NOTES
Subjective:   Patient is a 80y.o. year old female who presents for Bladder Infection  Having urinary frequency, new onset. This has been going on for a few days. No burning. No back pain, lower abdominal pain. No previous symptoms. She had similar problem in the past.  She had LOC at that time and was taken to hospital and dx with UTI but had a urologic issue that had to have surgery    Review of Systems   Constitutional: Negative. Cardiovascular: Negative. Current Outpatient Medications on File Prior to Visit   Medication Sig Dispense Refill    memantine (NAMENDA) 10 mg tablet TAKE 1 TABLET BY MOUTH TWICE A  Tab 1    sertraline (ZOLOFT) 50 mg tablet TAKE 1 TABLET BY MOUTH EVERY DAY 90 Tab 1    donepezil (ARICEPT) 10 mg tablet Take 1 Tab by mouth nightly. 90 Tab 1    aspirin delayed-release 81 mg tablet Take  by mouth daily.  amLODIPine (NORVASC) 5 mg tablet Take 1 Tab by mouth daily. 90 Tab 1     No current facility-administered medications on file prior to visit. Reviewed PmHx, RxHx, FmHx, SocHx, AllgHx and updated and dated in the chart. Nurse notes were reviewed and are correct    Objective:     Vitals:    12/14/18 1341   BP: 131/63   Pulse: (!) 57   Resp: 18   Temp: 97.5 °F (36.4 °C)   TempSrc: Oral   SpO2: 97%   Weight: 122 lb (55.3 kg)   Height: 5' (1.524 m)     Physical Exam   Constitutional: She is oriented to person, place, and time. She appears well-developed and well-nourished. No distress. HENT:   Head: Normocephalic and atraumatic. Cardiovascular: Normal rate, regular rhythm, normal heart sounds and intact distal pulses. Exam reveals no gallop and no friction rub. No murmur heard. Pulmonary/Chest: Effort normal and breath sounds normal. No respiratory distress. Abdominal: Soft. Bowel sounds are normal. She exhibits no distension. There is no tenderness. Musculoskeletal: She exhibits no edema. Lymphadenopathy:     She has no cervical adenopathy. Neurological: She is alert and oriented to person, place, and time. Skin: Skin is warm and dry. Psychiatric: She has a normal mood and affect. Her behavior is normal.   Nursing note and vitals reviewed. Assessment/ Plan:     Diagnoses and all orders for this visit:    1. Acute cystitis with hematuria:  U/A with trace blood and neg for nitrite and leuk. But symptoms with acute onset, sounding like UTI. Got cx, treating with abx, and if cx neg, will f/u to see how she's doing. If still with symptoms will send to urology. Discussed with her there could be other causes of frequency like overactive bladder. Recent labs normal and no sugar in urine so don't think it's diabetes  -     cephALEXin (KEFLEX) 500 mg capsule; Take 1 Cap by mouth two (2) times a day for 5 days. -     CULTURE, URINE; Future       I have discussed the diagnosis with the patient and the intended plan as seen in the above orders. The patient verbalized understanding and agrees with the plan.     Follow-up Disposition: Not on File    Moris Marcos MD

## 2018-12-14 NOTE — PATIENT INSTRUCTIONS
Urinary Tract Infection in Women: Care Instructions  Your Care Instructions    A urinary tract infection, or UTI, is a general term for an infection anywhere between the kidneys and the urethra (where urine comes out). Most UTIs are bladder infections. They often cause pain or burning when you urinate. UTIs are caused by bacteria and can be cured with antibiotics. Be sure to complete your treatment so that the infection goes away. Follow-up care is a key part of your treatment and safety. Be sure to make and go to all appointments, and call your doctor if you are having problems. It's also a good idea to know your test results and keep a list of the medicines you take. How can you care for yourself at home? · Take your antibiotics as directed. Do not stop taking them just because you feel better. You need to take the full course of antibiotics. · Drink extra water and other fluids for the next day or two. This may help wash out the bacteria that are causing the infection. (If you have kidney, heart, or liver disease and have to limit fluids, talk with your doctor before you increase your fluid intake.)  · Avoid drinks that are carbonated or have caffeine. They can irritate the bladder. · Urinate often. Try to empty your bladder each time. · To relieve pain, take a hot bath or lay a heating pad set on low over your lower belly or genital area. Never go to sleep with a heating pad in place. To prevent UTIs  · Drink plenty of water each day. This helps you urinate often, which clears bacteria from your system. (If you have kidney, heart, or liver disease and have to limit fluids, talk with your doctor before you increase your fluid intake.)  · Urinate when you need to. · Urinate right after you have sex. · Change sanitary pads often. · Avoid douches, bubble baths, feminine hygiene sprays, and other feminine hygiene products that have deodorants.   · After going to the bathroom, wipe from front to back.  When should you call for help? Call your doctor now or seek immediate medical care if:    · Symptoms such as fever, chills, nausea, or vomiting get worse or appear for the first time.     · You have new pain in your back just below your rib cage. This is called flank pain.     · There is new blood or pus in your urine.     · You have any problems with your antibiotic medicine.    Watch closely for changes in your health, and be sure to contact your doctor if:    · You are not getting better after taking an antibiotic for 2 days.     · Your symptoms go away but then come back. Where can you learn more? Go to http://eusebio-ev.info/. Enter T390 in the search box to learn more about \"Urinary Tract Infection in Women: Care Instructions. \"  Current as of: March 21, 2018  Content Version: 11.8  © 6291-2936 [employer]wise[employer]. Care instructions adapted under license by [employer] (which disclaims liability or warranty for this information). If you have questions about a medical condition or this instruction, always ask your healthcare professional. [address] any warranty or liability for your use of this information.

## 2018-12-14 NOTE — PROGRESS NOTES
Chief Complaint   Patient presents with    Bladder Infection     1. Have you been to the ER, urgent care clinic since your last visit? Hospitalized since your last visit? No    2. Have you seen or consulted any other health care providers outside of the [address] [place] [address] since your last visit? Include any pap smears or colon screening.  No

## 2018-12-16 LAB — BACTERIA UR CULT: NORMAL

## 2018-12-16 NOTE — PROGRESS NOTES
Her urine culture didn't grow out anything. See if she's feeling any better.   If not, then I could refer her to urology because if the antibiotic doesn't help, then maybe it's something else causing this like overactive bladder

## 2019-01-07 ENCOUNTER — TELEPHONE (OUTPATIENT)
Dept: INTERNAL MEDICINE CLINIC | Age: 84
End: 2019-01-07

## 2019-01-07 DIAGNOSIS — R35.0 URINARY FREQUENCY: Primary | ICD-10-CM

## 2019-01-07 NOTE — TELEPHONE ENCOUNTER
Pt called stating you had talked to her about Urology issues that may require surgery. She would like that referral sent now please.

## 2019-01-21 ENCOUNTER — TELEPHONE (OUTPATIENT)
Dept: INTERNAL MEDICINE CLINIC | Age: 84
End: 2019-01-21

## 2019-02-08 ENCOUNTER — TELEPHONE (OUTPATIENT)
Dept: INTERNAL MEDICINE CLINIC | Age: 84
End: 2019-02-08

## 2019-02-08 DIAGNOSIS — R35.0 URINARY FREQUENCY: Primary | ICD-10-CM

## 2019-02-08 NOTE — TELEPHONE ENCOUNTER
Pt's daughter called and wanted to ask if the was another place for her mom to be referred to for her frequent urination. She says the did not get good results with Urology and would like another work up. I suggested the [address] [place] [address] group at [facility]. I read to her about the group and expleained my experience there with my mom. She asked if we could send the referral there . I let her know you would and also gave her the # to call if she had not heard anything by Wed.

## 2019-03-28 ENCOUNTER — OFFICE VISIT (OUTPATIENT)
Dept: INTERNAL MEDICINE CLINIC | Age: 84
End: 2019-03-28

## 2019-03-28 VITALS
RESPIRATION RATE: 20 BRPM | HEART RATE: 60 BPM | TEMPERATURE: 97.3 F | SYSTOLIC BLOOD PRESSURE: 136 MMHG | BODY MASS INDEX: 24.35 KG/M2 | DIASTOLIC BLOOD PRESSURE: 62 MMHG | WEIGHT: 124 LBS | OXYGEN SATURATION: 95 % | HEIGHT: 60 IN

## 2019-03-28 DIAGNOSIS — F33.1 MODERATE EPISODE OF RECURRENT MAJOR DEPRESSIVE DISORDER (HCC): ICD-10-CM

## 2019-03-28 DIAGNOSIS — I10 ESSENTIAL HYPERTENSION: ICD-10-CM

## 2019-03-28 DIAGNOSIS — F03.90 DEMENTIA WITHOUT BEHAVIORAL DISTURBANCE, UNSPECIFIED DEMENTIA TYPE: Primary | ICD-10-CM

## 2019-03-28 RX ORDER — SERTRALINE HYDROCHLORIDE 100 MG/1
100 TABLET, FILM COATED ORAL DAILY
Qty: 90 TAB | Refills: 1 | Status: SHIPPED | OUTPATIENT
Start: 2019-03-28 | End: 2019-06-23 | Stop reason: SDUPTHER

## 2019-03-28 NOTE — PROGRESS NOTES
Subjective:  
Patient is a 80y.o. year old female who presents for Follow-up; Hypertension; and Dementia 1. HTN:  On amlodipine 5 mg. No swelling in legs. No symptoms. 2.  Dementia:  She's on Aricept and Namenda. Having continued memory problems with a slow decline. Used to be on Vit B12 but hasn't been recently. 3.  Depression:  Has been on Zoloft for this, 50 mg. Was helping but recently 4. Overactive bladder:  Seeing urology. Was given a medicine and it's helping. Review of Systems Constitutional: Negative. Cardiovascular: Negative. Current Outpatient Medications on File Prior to Visit Medication Sig Dispense Refill  memantine (NAMENDA) 10 mg tablet TAKE 1 TABLET BY MOUTH TWICE A  Tab 1  
 donepezil (ARICEPT) 10 mg tablet Take 1 Tab by mouth nightly. 90 Tab 1  
 amLODIPine (NORVASC) 5 mg tablet Take 1 Tab by mouth daily. 90 Tab 1  
 aspirin delayed-release 81 mg tablet Take  by mouth daily. No current facility-administered medications on file prior to visit. Reviewed PmHx, RxHx, FmHx, SocHx, AllgHx and updated and dated in the chart. Nurse notes were reviewed and are correct Objective:  
 
Vitals:  
 03/28/19 1058 03/28/19 1145 BP: 150/71 136/62 Pulse: 60 Resp: 20 Temp: 97.3 °F (36.3 °C) TempSrc: Oral   
SpO2: 95% Weight: 124 lb (56.2 kg) Height: 5' (1.524 m) Physical Exam  
Constitutional: She is oriented to person, place, and time. She appears well-developed and well-nourished. No distress. HENT:  
Head: Normocephalic and atraumatic. Right Ear: External ear normal.  
Left Ear: External ear normal.  
Nose: Nose normal.  
Mouth/Throat: Oropharynx is clear and moist.  
Eyes: Pupils are equal, round, and reactive to light. EOM are normal.  
Neck: Normal range of motion. Neck supple. Carotid bruit is not present. No tracheal deviation present.   
Cardiovascular: Normal rate, regular rhythm, normal heart sounds and intact distal pulses. Exam reveals no gallop and no friction rub. No murmur heard. Pulmonary/Chest: Effort normal and breath sounds normal. She has no wheezes. She has no rales. Abdominal: Soft. Bowel sounds are normal. She exhibits no distension. There is no tenderness. Musculoskeletal: She exhibits no edema or tenderness. Lymphadenopathy:  
  She has no cervical adenopathy. Neurological: She is alert and oriented to person, place, and time. Skin: Skin is warm and dry. Psychiatric: She has a normal mood and affect. Her behavior is normal.  
Nursing note and vitals reviewed. Assessment/ Plan:  
 
Diagnoses and all orders for this visit: 1. Dementia without behavioral disturbance, unspecified dementia type:  Continue Aricept, Namenda. 2. Moderate episode of recurrent major depressive disorder (HCC) 
-     sertraline (ZOLOFT) 100 mg tablet; Take 1 Tab by mouth daily. 3. Essential hypertension:  BP controlled. Continue 5 mg Norvasc. I have discussed the diagnosis with the patient and the intended plan as seen in the above orders. The patient verbalized understanding and agrees with the plan. Follow-up and Dispositions · Return in about 6 months (around 9/28/2019) for [facility] [place] [facility], fasting labs, HTN, Dementia.  
  
 
 
Fabrizio Cline MD

## 2019-03-28 NOTE — PROGRESS NOTES
Braulio Chan is a 80 y.o. female (: 1929) presenting to address: Chief Complaint Patient presents with  Follow-up  Hypertension  Dementia Medication list and allergies have been reviewed with Braulio Chan and updated as of today's date. I have gone over all Medical, Surgical and Social History with Braulio Chan and updated/added the information accordingly. 1. Have you been to the ER, Urgent Care or Hospitalized since your last visit? NO 
 
 
2. Have you followed up with your PCP or any other Physicians since your procedure/ last office visit? YES, Gyno/Uro 3/26 
 
3 most recent PHQ Screens 3/28/2019 Little interest or pleasure in doing things Not at all Feeling down, depressed, irritable, or hopeless Not at all Total Score PHQ 2 0 VORB: No orders of the defined types were placed in this encounter. 
Stacy King MD/Tremaine Angel

## 2019-05-01 DIAGNOSIS — F03.90 DEMENTIA WITHOUT BEHAVIORAL DISTURBANCE, UNSPECIFIED DEMENTIA TYPE: ICD-10-CM

## 2019-05-01 RX ORDER — DONEPEZIL HYDROCHLORIDE 10 MG/1
TABLET, FILM COATED ORAL
Qty: 90 TAB | Refills: 1 | Status: SHIPPED | OUTPATIENT
Start: 2019-05-01 | End: 2019-09-27 | Stop reason: SDUPTHER

## 2019-06-07 DIAGNOSIS — F03.90 DEMENTIA WITHOUT BEHAVIORAL DISTURBANCE, UNSPECIFIED DEMENTIA TYPE: ICD-10-CM

## 2019-06-09 RX ORDER — MEMANTINE HYDROCHLORIDE 10 MG/1
TABLET ORAL
Qty: 180 TAB | Refills: 1 | Status: SHIPPED | OUTPATIENT
Start: 2019-06-09 | End: 2019-09-27 | Stop reason: SDUPTHER

## 2019-06-23 DIAGNOSIS — F33.1 MODERATE EPISODE OF RECURRENT MAJOR DEPRESSIVE DISORDER (HCC): ICD-10-CM

## 2019-06-23 RX ORDER — SERTRALINE HYDROCHLORIDE 100 MG/1
TABLET, FILM COATED ORAL
Qty: 90 TAB | Refills: 1 | Status: SHIPPED | OUTPATIENT
Start: 2019-06-23 | End: 2019-09-27 | Stop reason: SDUPTHER

## 2019-07-18 ENCOUNTER — OFFICE VISIT (OUTPATIENT)
Dept: INTERNAL MEDICINE CLINIC | Age: 84
End: 2019-07-18

## 2019-07-18 ENCOUNTER — HOSPITAL ENCOUNTER (OUTPATIENT)
Dept: LAB | Age: 84
Discharge: HOME | End: 2019-07-18
Payer: MEDICARE

## 2019-07-18 VITALS
DIASTOLIC BLOOD PRESSURE: 66 MMHG | WEIGHT: 122 LBS | HEART RATE: 60 BPM | BODY MASS INDEX: 23.95 KG/M2 | HEIGHT: 60 IN | RESPIRATION RATE: 18 BRPM | SYSTOLIC BLOOD PRESSURE: 125 MMHG | TEMPERATURE: 98.2 F

## 2019-07-18 DIAGNOSIS — N18.30 CHRONIC RENAL IMPAIRMENT, STAGE 3 (MODERATE) (HCC): ICD-10-CM

## 2019-07-18 DIAGNOSIS — E78.00 PURE HYPERCHOLESTEROLEMIA: ICD-10-CM

## 2019-07-18 DIAGNOSIS — I10 ESSENTIAL HYPERTENSION: ICD-10-CM

## 2019-07-18 DIAGNOSIS — Z78.0 POST-MENOPAUSAL: ICD-10-CM

## 2019-07-18 DIAGNOSIS — R53.83 LETHARGY: Primary | ICD-10-CM

## 2019-07-18 LAB
ALBUMIN SERPL-MCNC: 3.9 G/DL (ref 3.4–5)
ALBUMIN/GLOB SERPL: 1.6 {RATIO} (ref 0.8–1.7)
ALP SERPL-CCNC: 165 U/L (ref 45–117)
ALT SERPL-CCNC: 17 U/L (ref 13–56)
ANION GAP SERPL CALC-SCNC: 7 MMOL/L (ref 3–18)
AST SERPL-CCNC: 10 U/L (ref 10–38)
BASOPHILS # BLD: 0 K/UL (ref 0–0.1)
BASOPHILS NFR BLD: 1 % (ref 0–2)
BILIRUB SERPL-MCNC: 0.3 MG/DL (ref 0.2–1)
BUN SERPL-MCNC: 25 MG/DL (ref 7–18)
BUN/CREAT SERPL: 20 (ref 12–20)
CALCIUM SERPL-MCNC: 9.2 MG/DL (ref 8.5–10.1)
CHLORIDE SERPL-SCNC: 106 MMOL/L (ref 100–111)
CHOLEST SERPL-MCNC: 212 MG/DL
CO2 SERPL-SCNC: 29 MMOL/L (ref 21–32)
CREAT SERPL-MCNC: 1.26 MG/DL (ref 0.6–1.3)
DIFFERENTIAL METHOD BLD: ABNORMAL
EOSINOPHIL # BLD: 0.2 K/UL (ref 0–0.4)
EOSINOPHIL NFR BLD: 2 % (ref 0–5)
ERYTHROCYTE [DISTWIDTH] IN BLOOD BY AUTOMATED COUNT: 15.6 % (ref 11.6–14.5)
GLOBULIN SER CALC-MCNC: 2.4 G/DL (ref 2–4)
GLUCOSE SERPL-MCNC: 84 MG/DL (ref 74–99)
HCT VFR BLD AUTO: 45.9 % (ref 35–45)
HDLC SERPL-MCNC: 73 MG/DL (ref 40–60)
HDLC SERPL: 2.9 {RATIO} (ref 0–5)
HGB BLD-MCNC: 14.5 G/DL (ref 12–16)
LDLC SERPL CALC-MCNC: 119.2 MG/DL (ref 0–100)
LIPID PROFILE,FLP: ABNORMAL
LYMPHOCYTES # BLD: 1.7 K/UL (ref 0.9–3.6)
LYMPHOCYTES NFR BLD: 27 % (ref 21–52)
MCH RBC QN AUTO: 29.5 PG (ref 24–34)
MCHC RBC AUTO-ENTMCNC: 31.6 G/DL (ref 31–37)
MCV RBC AUTO: 93.3 FL (ref 74–97)
MONOCYTES # BLD: 0.5 K/UL (ref 0.05–1.2)
MONOCYTES NFR BLD: 8 % (ref 3–10)
NEUTS SEG # BLD: 4 K/UL (ref 1.8–8)
NEUTS SEG NFR BLD: 62 % (ref 40–73)
PLATELET # BLD AUTO: 216 K/UL (ref 135–420)
PMV BLD AUTO: 11.4 FL (ref 9.2–11.8)
POTASSIUM SERPL-SCNC: 4.7 MMOL/L (ref 3.5–5.5)
PROT SERPL-MCNC: 6.3 G/DL (ref 6.4–8.2)
RBC # BLD AUTO: 4.92 M/UL (ref 4.2–5.3)
SODIUM SERPL-SCNC: 142 MMOL/L (ref 136–145)
TRIGL SERPL-MCNC: 99 MG/DL (ref ?–150)
TSH SERPL DL<=0.05 MIU/L-ACNC: 0.75 UIU/ML (ref 0.36–3.74)
VLDLC SERPL CALC-MCNC: 19.8 MG/DL
WBC # BLD AUTO: 6.4 K/UL (ref 4.6–13.2)

## 2019-07-18 PROCEDURE — 80053 COMPREHEN METABOLIC PANEL: CPT

## 2019-07-18 PROCEDURE — 85025 COMPLETE CBC W/AUTO DIFF WBC: CPT

## 2019-07-18 PROCEDURE — 36415 COLL VENOUS BLD VENIPUNCTURE: CPT

## 2019-07-18 PROCEDURE — 80061 LIPID PANEL: CPT

## 2019-07-18 PROCEDURE — 84443 ASSAY THYROID STIM HORMONE: CPT

## 2019-07-18 NOTE — PROGRESS NOTES
Subjective:   Patient is a 80y.o. year old female who presents for Low Blood Sugar (only once and 2 weeks ago)  Low blood sugar:  She lives at [facility], independent living. 2 weeks ago when her aid came to work with her in the morning before she had eating, she was very weak and almost passed out. They called EMS. They tested blood sugar and was 65. They gave her something to increase it and she was fine after that. A few days before that was lethargic and sleeping all day. No other bouts since then. Doing better now. Not sure what the problem was. She has a full dinner every night. Has a good breakfast too and trying to eat fruits with that. For lunch having a sandwich or salad. Will have some chicken salad with that to increase the protein. In April she broke her arm due to a fall. Since then they've had an aid come in in the morning and evening to help with ADLs. She's having some gait instability. Has been under the care of ortho, Dr. Moo Lawson. Dementia:  On Namenda, Aricept. Doing ok. Review of Systems   Constitutional: Negative. Cardiovascular: Negative. Current Outpatient Medications on File Prior to Visit   Medication Sig Dispense Refill    mirabegron ER (MYRBETRIQ) 25 mg ER tablet Take 25 mg by mouth daily.  sertraline (ZOLOFT) 100 mg tablet TAKE 1 TABLET BY MOUTH EVERY DAY 90 Tab 1    memantine (NAMENDA) 10 mg tablet TAKE 1 TABLET BY MOUTH TWICE A  Tab 1    amLODIPine (NORVASC) 5 mg tablet TAKE 1 TABLET BY MOUTH EVERY DAY 90 Tab 1    donepezil (ARICEPT) 10 mg tablet TAKE 1 TABLET BY MOUTH EVERY DAY AT NIGHT 90 Tab 1    aspirin delayed-release 81 mg tablet Take  by mouth daily. No current facility-administered medications on file prior to visit. Reviewed PmHx, RxHx, FmHx, SocHx, AllgHx and updated and dated in the chart.     Nurse notes were reviewed and are correct    Objective:     Vitals:    07/18/19 1546   BP: 125/66   Pulse: 60   Resp: 18   Temp: 98.2 °F (36.8 °C)   TempSrc: Oral   Weight: 122 lb (55.3 kg)   Height: 5' (1.524 m)     Physical Exam   Constitutional: She is oriented to person, place, and time. She appears well-developed and well-nourished. No distress. HENT:   Head: Normocephalic and atraumatic. Eyes: Conjunctivae and EOM are normal.   Neck: Normal range of motion. Neck supple. Cardiovascular: Normal rate, regular rhythm, normal heart sounds and intact distal pulses. Exam reveals no gallop and no friction rub. No murmur heard. Pulmonary/Chest: Effort normal and breath sounds normal. No respiratory distress. Abdominal: Soft. Bowel sounds are normal. She exhibits no distension. There is no tenderness. Musculoskeletal: Normal range of motion. She exhibits no edema. Lymphadenopathy:     She has no cervical adenopathy. Neurological: She is alert and oriented to person, place, and time. Skin: Skin is warm and dry. No rash noted. She is not diaphoretic. No erythema. No pallor. Psychiatric: She has a normal mood and affect. Her behavior is normal. Judgment and thought content normal.   Nursing note and vitals reviewed. Assessment/ Plan:     Diagnoses and all orders for this visit:    1. Lethargy:  Possibly from low sugar. Has resolved. Recommended eating breakfast right when gets up. Getting blood work to evaluate other possible causes. 2. Essential hypertension  -     METABOLIC PANEL, COMPREHENSIVE; Future  -     CBC WITH AUTOMATED DIFF; Future  -     TSH 3RD GENERATION; Future    3. Pure hypercholesterolemia  -     LIPID PANEL; Future  -     TSH 3RD GENERATION; Future    4. Chronic renal impairment, stage 3 (moderate) (HCC)  -     METABOLIC PANEL, COMPREHENSIVE; Future    5. Post-menopausal:  With recent wrist fracture. Getting Dexa scan to evaluate. Last one was in 2006. -     DEXA BONE DENSITY STUDY AXIAL;  Future       I have discussed the diagnosis with the patient and the intended plan as seen in the above orders. The patient verbalized understanding and agrees with the plan. Follow-up and Dispositions    · Return in about 6 months (around 1/18/2020) for [address], HTN.          Anila Bui MD

## 2019-07-19 ENCOUNTER — DOCUMENTATION ONLY (OUTPATIENT)
Dept: INTERNAL MEDICINE CLINIC | Age: 84
End: 2019-07-19

## 2019-07-21 NOTE — PROGRESS NOTES
Blood work looked good. Sugar was 84, so that's fine. Cholesterol remains a little high but she doesn't need to be on medicine, just a healthy diet. Kidney function is decreased but stable, not too different from last time. She shouldn't take any antiinflammatories like Motrin or Aleve and drink plenty of water. Other labs looked fine.

## 2019-09-26 ENCOUNTER — OFFICE VISIT (OUTPATIENT)
Dept: INTERNAL MEDICINE CLINIC | Age: 84
End: 2019-09-26

## 2019-09-26 VITALS
BODY MASS INDEX: 24.54 KG/M2 | HEART RATE: 66 BPM | OXYGEN SATURATION: 98 % | WEIGHT: 125 LBS | RESPIRATION RATE: 17 BRPM | HEIGHT: 60 IN | DIASTOLIC BLOOD PRESSURE: 69 MMHG | TEMPERATURE: 97.8 F | SYSTOLIC BLOOD PRESSURE: 144 MMHG

## 2019-09-26 DIAGNOSIS — R26.81 GAIT INSTABILITY: Primary | ICD-10-CM

## 2019-09-26 DIAGNOSIS — R29.6 FREQUENT FALLS: ICD-10-CM

## 2019-09-26 DIAGNOSIS — H53.8 CLOUDY VISION: ICD-10-CM

## 2019-09-26 NOTE — PROGRESS NOTES
Chief Complaint   Patient presents with    Referral Request     Therapy   1. Have you been to the ER, urgent care clinic since your last visit? Hospitalized since your last visit? No    2. Have you seen or consulted any other health care providers outside of the [address] [place] [address] since your last visit? Include any pap smears or colon screening.  No

## 2019-09-26 NOTE — PROGRESS NOTES
Subjective:   Patient is a 80y.o. year old female who presents for Referral Request (Therapy)  Falls:  Not many but having some. Had a skin tear once and a broken arm once. Some days is less steady and some days more. Hoping for evaluation for what she needs from equipment standpoint and training to use it as well as some strengthening. She lives at [facility]. Toe problems:  Seeing podiatry tomorrow. Has nail issues and maybe toenail fungus. Cloudy vision:  Right eye. Went to a doctor and unhappy so would like a new doctor. Review of Systems   Constitutional: Negative. Cardiovascular: Negative. Current Outpatient Medications on File Prior to Visit   Medication Sig Dispense Refill    mirabegron ER (MYRBETRIQ) 25 mg ER tablet Take 25 mg by mouth daily.  sertraline (ZOLOFT) 100 mg tablet TAKE 1 TABLET BY MOUTH EVERY DAY 90 Tab 1    memantine (NAMENDA) 10 mg tablet TAKE 1 TABLET BY MOUTH TWICE A  Tab 1    amLODIPine (NORVASC) 5 mg tablet TAKE 1 TABLET BY MOUTH EVERY DAY 90 Tab 1    donepezil (ARICEPT) 10 mg tablet TAKE 1 TABLET BY MOUTH EVERY DAY AT NIGHT 90 Tab 1    aspirin delayed-release 81 mg tablet Take  by mouth daily. No current facility-administered medications on file prior to visit. Reviewed PmHx, RxHx, FmHx, SocHx, AllgHx and updated and dated in the chart. Nurse notes were reviewed and are correct    Objective:     Vitals:    09/26/19 1620   BP: 144/69   Pulse: 66   Resp: 17   Temp: 97.8 °F (36.6 °C)   TempSrc: Oral   SpO2: 98%   Weight: 125 lb (56.7 kg)   Height: 5' (1.524 m)     Physical Exam   Constitutional: She is oriented to person, place, and time. She appears well-developed and well-nourished. No distress. HENT:   Head: Normocephalic and atraumatic. Eyes: Conjunctivae and EOM are normal.   Right eye:  Possibly some cloudiness of the cornea   Neck: Normal range of motion. Neck supple.    Cardiovascular: Normal rate, regular rhythm, normal heart sounds and intact distal pulses. Exam reveals no gallop and no friction rub. No murmur heard. Pulmonary/Chest: Effort normal and breath sounds normal. No respiratory distress. Abdominal: Soft. Bowel sounds are normal. She exhibits no distension. There is no tenderness. Musculoskeletal: Normal range of motion. She exhibits no edema. Lymphadenopathy:     She has no cervical adenopathy. Neurological: She is alert and oriented to person, place, and time. Skin: Skin is warm and dry. No rash noted. She is not diaphoretic. No erythema. No pallor. Psychiatric: She has a normal mood and affect. Her behavior is normal. Judgment and thought content normal.   Nursing note and vitals reviewed. Assessment/ Plan:     Diagnoses and all orders for this visit:    1. Gait instability:  Sending to PT to see what walking assistance device they would recommend and also for some gait/balance work. -     REFERRAL TO PHYSICAL THERAPY    2. Frequent falls:  Sending to PT, already ordered DEXA but not done yet so rechecking on where it went to and will give the daughter the number to call.  -     REFERRAL TO PHYSICAL THERAPY    3. Cloudy vision:  Possible cataract.  -     REFERRAL TO OPHTHALMOLOGY       I have discussed the diagnosis with the patient and the intended plan as seen in the above orders. The patient verbalized understanding and agrees with the plan. Follow-up and Dispositions    · Return for as scheduled.          Melissa Leigh MD

## 2019-09-27 ENCOUNTER — DOCUMENTATION ONLY (OUTPATIENT)
Dept: INTERNAL MEDICINE CLINIC | Age: 84
End: 2019-09-27

## 2019-09-27 DIAGNOSIS — F03.90 DEMENTIA WITHOUT BEHAVIORAL DISTURBANCE, UNSPECIFIED DEMENTIA TYPE: ICD-10-CM

## 2019-09-27 DIAGNOSIS — I10 ESSENTIAL HYPERTENSION: ICD-10-CM

## 2019-09-27 DIAGNOSIS — F33.1 MODERATE EPISODE OF RECURRENT MAJOR DEPRESSIVE DISORDER (HCC): ICD-10-CM

## 2019-09-27 RX ORDER — DONEPEZIL HYDROCHLORIDE 10 MG/1
10 TABLET, FILM COATED ORAL
Qty: 90 TAB | Refills: 1 | Status: SHIPPED | OUTPATIENT
Start: 2019-09-27

## 2019-09-27 RX ORDER — SERTRALINE HYDROCHLORIDE 100 MG/1
100 TABLET, FILM COATED ORAL DAILY
Qty: 90 TAB | Refills: 1 | Status: SHIPPED | OUTPATIENT
Start: 2019-09-27

## 2019-09-27 RX ORDER — MEMANTINE HYDROCHLORIDE 10 MG/1
10 TABLET ORAL 2 TIMES DAILY
Qty: 180 TAB | Refills: 1 | Status: SHIPPED | OUTPATIENT
Start: 2019-09-27

## 2019-09-27 RX ORDER — AMLODIPINE BESYLATE 5 MG/1
5 TABLET ORAL DAILY
Qty: 90 TAB | Refills: 1 | Status: SHIPPED | OUTPATIENT
Start: 2019-09-27

## 2019-10-03 ENCOUNTER — HOSPITAL ENCOUNTER (OUTPATIENT)
Dept: PHYSICAL THERAPY | Age: 84
Discharge: HOME | End: 2019-10-03
Payer: MEDICARE

## 2019-10-03 PROCEDURE — 97162 PT EVAL MOD COMPLEX 30 MIN: CPT

## 2019-10-03 PROCEDURE — 97116 GAIT TRAINING THERAPY: CPT

## 2019-10-03 NOTE — PROGRESS NOTES
[facility] at [address] [facility] [address] [facility], [address] [place] [address]  Phone [phone]   Fax: [phone]    Physical Therapy Recommendation      Patient's name Regulo Das   Referring Physician:Dr. Mikayla Leigh   Treatment Diagnosis: gait instability   YOB: 1929   MRN: [id number]     Onset Date:April 2019  Date of Service:10/3/2019    Total Treatments: 1       Patient may benefit from a rolling walker to assist with ambulation & prevent risk of fall with ADLs. If you are in agreement, please sign below. Thank you for this referral.        Thank you,  Therapist: Anita Seo, NASREEN  Date: 10/3/2019  Time:3:37 PM  _______________________________________________________________________    I certify that the above Therapy Services are being furnished while the patient is under my care. I agree with the treatment plan and certify that this therapy is necessary. Y or N I have read the above and request that my patient continue as recommended. Y or N I have read the above report and request that my patient continue therapy with the following changes/special instructions:______________________________________________________________  Y or N I have read the above report and do not wish to have a RW provided    Physician's Signature:____________________  Date:________________    Please sign and return to [facility] at [address] [facility] [address] [facility], [address] [place] [address]  Or fax to [phone].

## 2019-10-03 NOTE — PROGRESS NOTES
PHYSICAL THERAPY - DAILY TREATMENT NOTE    Patient Name: Magalie Sousa        Date: 10/3/2019  : 1929   YES Patient  Verified  Visit #:     Insurance: Payor: Mouna Sarah / [facility] / Product Type: Managed Care Medicare /      In time: 2:35 P Out time: 3:30 P   Total Treatment Time: 50     BCBS/Medicare Time Tracking (below)   Total Timed Codes (min):  50 1:1 Treatment Time:  50     TREATMENT AREA =  Frequent falls [R29.6]  Gait instability [R26.81]    SUBJECTIVE  Pain Level (on 0 to 10 scale):  0  / 10   Medication Changes/New allergies or changes in medical history, any new surgeries or procedures? NO    If yes, update Summary List   Subjective Functional Status/Changes:  []  No changes reported     See POC           Modalities Rationale:NA   min [] Estim, type/location:                                      []  att     []  unatt     []  w/US     []  w/ice    []  w/heat    min []  Mechanical Traction: type/lbs                   []  pro   []  sup   []  int   []  cont    []  before manual    []  after manual    min []  Ultrasound, settings/location:      min []  Iontophoresis w/ dexamethasone, location:                                               []  take home patch       []  in clinic    min []  Ice     []  Heat    location/position:     min []  Vasopneumatic Device, press/temp:     min []  Other:    [] Skin assessment post-treatment (if applicable):    []  intact    []  redness- no adverse reaction     []redness - adverse reaction:        10 min Gait Training:  _10__ feet with _SPC on R as well as RW__ device on level surfaces with _SBA_ level of assistance   Rationale: To improve ambulation safety and efficiency in order to improve patient's ability to safely ambulate at home for self care.     Billed With/As:   [] TE   [] TA   [] Neuro   [] Self Care Patient Education: [x] Review HEP    [] Progressed/Changed HEP based on:   [] positioning   [] body mechanics   [] transfers   [] heat/ice application    [] other:      Other Objective/Functional Measures:    See POC     Post Treatment Pain Level (on 0 to 10) scale:   0  / 10     ASSESSMENT  Assessment/Changes in Function:     See POC     []  See Progress Note/Recertification   Patient will continue to benefit from skilled PT services to modify and progress therapeutic interventions, address functional mobility deficits, address ROM deficits, address strength deficits, analyze and modify body mechanics/ergonomics, assess and modify postural abnormalities, address imbalance/dizziness and instruct in home and community integration to attain remaining goals.    Progress toward goals / Updated goals:    Progressing towards goals established at [address] [facility] [id number]  []  Upgrade activities as tolerated YES Continue plan of care   []  Discharge due to :    []  Other:      Therapist: Roman Varner PT    Date: 10/3/2019 Time: 3:36 PM     Future Appointments   Date Time Provider Trevor Quinones   10/8/2019  2:30 PM [facility]   10/10/2019  2:30 PM Stella Miller [facility]   10/15/2019  2:30 PM Denice Badillo, PT [facility]   10/17/2019  3:00 PM [facility]   10/22/2019  2:30 PM Denice Badillo PT [facility]   10/24/2019  2:30 PM [facility]   10/28/2019  2:00 PM Ashley Stanley MD [facility]   10/29/2019  1:00 PM Beltran Carrasquillo PA-C [address]   10/29/2019  2:30 PM Stella [facility]   10/31/2019  3:00 PM [facility]

## 2019-10-03 NOTE — PROGRESS NOTES
[facility] AT [address] [place] [address] Reggie Plass [address], [address] [place] [address], [address] [facility] [address]  Phone: [phone]  Fax: [phone] / [address] [address] PHYSICAL THERAPY SERVICES  Patient Name: Valentina Russell : 1929   Medical   Diagnosis: Frequent falls [R29.6]  Gait instability [R26.81] Treatment Diagnosis: imbalance   Onset Date: 2019     Referral Source: Joesph Santamaria MD Start of Care [facility]): 10/3/2019   Prior Hospitalization: See medical history Provider #: [id number]   Prior Level of Function: Previous fall fx   Comorbidities: HTN   Medications: Verified on Patient Summary List   The Plan of Care and following information is based on the information from the initial evaluation.   ==================================================================================  Assessment / key information:  Patient is a pleasant 80 y.o. female who [facility] with c/o imbalance. Patient reports one fall in 2019 when she caught her foot on something & fell & caught herself with her R arm, she reports a wrist Fx which as immobilized in cast & reports no complaints. She reports c/o imbalance & generalized weakness & fatigue. She denies c/o pain or dizziness. She lives at home alone in an apartment complex that is elevator accessible. She scored 28/56 on Jurado Balance Assessment & 5/24 on DGI indicating r/o fall with ADLs & ambulation. Upon objective evaluation, patient required HHA to ambulate in clinic as well as max v/c & SBA for safe performance of transfers. MMT revealed  Overall B LE strength at 4+/5.    Patient can benefit PT interventions to improve balance & gait, decrease further risk of fall to facilitate return to unlimited ADLs work activities & overall functional status.   ==================================================================================  Eval Complexity: History MEDIUM Complexity : 1-2 comorbidities / personal factors will impact the outcome/ POC ;  Examination  MEDIUM Complexity : 3 Standardized tests and measures addressing body structure, function, activity limitation and / or participation in recreation ; Presentation MEDIUM Complexity : Evolving with changing characteristics ; Decision Making MEDIUM Complexity : FOTO score of 26-74; Overall Complexity MEDIUM  Problem List: pain affecting function, decrease ROM, decrease strength, edema affecting function, impaired gait/ balance, decrease ADL/ functional abilitiies, decrease activity tolerance, decrease flexibility/ joint mobility, decrease transfer abilities and other FOTO 52   Treatment Plan may include any combination of the following: Therapeutic exercise, Therapeutic activities, Neuromuscular re-education, Physical agent/modality, Gait/balance training, Manual therapy, Aquatic therapy, Patient education, Self Care training, Functional mobility training, Home safety training and Stair training  Patient / Family readiness to learn indicated by: asking questions, trying to perform skills and interest  Persons(s) to be included in education: patient (P)  Barriers to Learning/Limitations: None  Measures taken:    Patient Goal (s): \"regain assuredness when walking\"   Patient self reported health status: good  Rehabilitation Potential: good   Short Term Goals: To be accomplished in  2  weeks:  1) Establish HEP to prevent further disability. 2) Patient will report no further incidence of falls with use of fall prevention techniques at home. 3) Improve FOTO score from 52 points to > or = 60 points indicating improved tolerance with ADLs  4) Patient will be able to maintain mSR for 30 seconds with eyes closed indicating improved use of hip balance strategy for safety with ambulation in challenging environments.  Long Term Goals:  To be accomplished in  4  weeks:  1) Improve FOTO score from 60 points to > or = 67 points indicating improved tolerance with ADLs. 2) Patient to improve score on Jurado balance assessment from 28/56 to > or = 38/56 indicating decreased r/o fall with ADLs. 3) Improve score on DGI from 5/24 to > or = 10/24 indicating decreased r/o fall with ambulation. 4) Patient to be independent & compliant with HEP in preparation for D/C. Frequency / Duration:   Patient to be seen  2-3  times per week for 4  weeks:  Patient / Caregiver education and instruction: self care, activity modification, brace/ splint application and exercises    Therapist Signature: SIS Rivers, cert MDT Date: 55/9/8898   Certification Period: 10-03-19 to 1-01-20 Time: 2:46 PM   ==================================================================================  I certify that the above Physical Therapy Services are being furnished while the patient is under my care. I agree with the treatment plan and certify that this therapy is necessary. Physician Signature:        Date:       Time:     Please sign and return to [facility] or you may fax the signed copy to [phone]. Thank you.

## 2019-10-08 ENCOUNTER — HOSPITAL ENCOUNTER (OUTPATIENT)
Dept: PHYSICAL THERAPY | Age: 84
Discharge: HOME | End: 2019-10-08
Payer: MEDICARE

## 2019-10-08 PROCEDURE — 97110 THERAPEUTIC EXERCISES: CPT

## 2019-10-14 ENCOUNTER — HOSPITAL ENCOUNTER (OUTPATIENT)
Dept: PHYSICAL THERAPY | Age: 84
Discharge: HOME | End: 2019-10-14
Payer: MEDICARE

## 2019-10-14 PROCEDURE — 97116 GAIT TRAINING THERAPY: CPT

## 2019-10-14 PROCEDURE — 97110 THERAPEUTIC EXERCISES: CPT

## 2019-10-14 NOTE — PROGRESS NOTES
PHYSICAL THERAPY - DAILY TREATMENT NOTE    Patient Name: Caitlin Artis        Date: 10/14/2019  : 1929   YES Patient  Verified  Visit #:   3   of   12  Insurance: Payor: Margarita Richard / Plan: [facility] OSIEL MEDICARE COMPLETE / Product Type: Managed Care Medicare /      In time: 11:30 A Out time: 12:15 P   Total Treatment Time: 40     BCBS/Medicare Time Tracking (below)   Total Timed Codes (min):  40 1:1 Treatment Time:  40     TREATMENT AREA = Frequent falls [R29.6]  Gait instability [R26.81]    SUBJECTIVE  Pain Level (on 0 to 10 scale):  0  / 10   Medication Changes/New allergies or changes in medical history, any new surgeries or procedures? NO    If yes, update Summary List   Subjective Functional Status/Changes:  []  No changes reported     Patient reports no new complaints, no falls over the weekend. Modalities Rationale:    PD   min [] Estim, type/location:                                      []  att     []  unatt     []  w/US     []  w/ice    []  w/heat    min []  Mechanical Traction: type/lbs                   []  pro   []  sup   []  int   []  cont    []  before manual    []  after manual    min []  Ultrasound, settings/location:      min []  Iontophoresis w/ dexamethasone, location:                                               []  take home patch       []  in clinic    min []  Ice     []  Heat    location/position:     min []  Vasopneumatic Device, press/temp:     min []  Other:    [] Skin assessment post-treatment (if applicable):    []  intact    []  redness- no adverse reaction     []redness - adverse reaction:        30 min Therapeutic Exercise:  [x]  See flow sheet   Rationale:      increase ROM and increase strength to improve the patients ability to return to independent sit to stand transfers     10 min Gait Training:  _30__ feet with _no__ device on level surfaces with _CGA__ level of assistance   Rationale:  To improve ambulation safety and efficiency in order to improve patient's ability to safely ambulate at home for self care. Billed With/As:   [] TE   [] TA   [] Neuro   [] Self Care Patient Education: [x] Review HEP    [] Progressed/Changed HEP based on:   [] positioning   [] body mechanics   [] transfers   [] heat/ice application    [] other:      Other Objective/Functional Measures:    See flow sheet for added gait training today, increased to 6 inch step up     Post Treatment Pain Level (on 0 to 10) scale:   0  / 10     ASSESSMENT  Assessment/Changes in Function:     CGA with all gait activities today to prevent LOB      []  See Progress Note/Recertification   Patient will continue to benefit from skilled PT services to modify and progress therapeutic interventions, address functional mobility deficits, address ROM deficits, address strength deficits, address imbalance/dizziness and instruct in home and community integration to attain remaining goals.    Progress toward goals / Updated goals:    Progressing towards STG 2, 4      PLAN  []  Upgrade activities as tolerated YES Continue plan of care   []  Discharge due to :    []  Other:      Therapist: Marisol Cardenas PT    Date: 10/14/2019 Time: 12:14 PM     Future Appointments   Date Time Provider Trevor Quinones   10/18/2019  3:00 PM Claudetta Courts [facility]   10/22/2019  2:30 PM Susi Osborne [place] [facility]   10/24/2019  2:30 PM Claudetta Courts [facility]   10/28/2019  2:00 PM MD NAZIA Bhatia   10/29/2019  2:30 PM Claudetta Courts [facility]   10/31/2019  3:00 PM [facility]

## 2019-10-15 ENCOUNTER — APPOINTMENT (OUTPATIENT)
Dept: PHYSICAL THERAPY | Age: 84
End: 2019-10-15
Payer: MEDICARE

## 2019-10-17 ENCOUNTER — APPOINTMENT (OUTPATIENT)
Dept: PHYSICAL THERAPY | Age: 84
End: 2019-10-17
Payer: MEDICARE

## 2019-10-18 ENCOUNTER — HOSPITAL ENCOUNTER (OUTPATIENT)
Dept: PHYSICAL THERAPY | Age: 84
Discharge: HOME | End: 2019-10-18
Payer: MEDICARE

## 2019-10-18 PROCEDURE — 97110 THERAPEUTIC EXERCISES: CPT

## 2019-10-18 NOTE — PROGRESS NOTES
PHYSICAL THERAPY - DAILY TREATMENT NOTE    Patient Name: Yoel Aguirre        Date: 10/18/2019  : 1929   YES Patient  Verified  Visit #:   4   of   12  Insurance: Payor: Celena Marianne / Plan: [employer] MEDICARE COMPLETE / Product Type: Managed Care Medicare /      In time: 300 Out time: 345   Total Treatment Time: 45     BCBS/Medicare Time Tracking (below)   Total Timed Codes (min):  45 1:1 Treatment Time:  45     TREATMENT AREA =  Frequent falls [R29.6]  Gait instability [R26.81]    SUBJECTIVE  Pain Level (on 0 to 10 scale):  0  / 10   Medication Changes/New allergies or changes in medical history, any new surgeries or procedures? NO    If yes, update Summary List   Subjective Functional Status/Changes:  []  No changes reported     Patient visibly upset when entering clinic with daughter, patient reports being very emotional today due to being homesick. Patient daughter reports that they delivery the FWW To their home. Patient however states that she doesn't remember having a walker at home and constantly grabs the walls for support.             Modalities Rationale:        min [] Estim, type/location:                                      []  att     []  unatt     []  w/US     []  w/ice    []  w/heat    min []  Mechanical Traction: type/lbs                   []  pro   []  sup   []  int   []  cont    []  before manual    []  after manual    min []  Ultrasound, settings/location:      min []  Iontophoresis w/ dexamethasone, location:                                               []  take home patch       []  in clinic    min []  Ice     []  Heat    location/position:     min []  Vasopneumatic Device, press/temp:     min []  Other:    [x] Skin assessment post-treatment (if applicable):    [x]  intact    [x]  redness- no adverse reaction     []redness - adverse reaction:        45 min Therapeutic Exercise:  [x]  See flow sheet   Rationale:      increase ROM, increase strength, improve coordination, improve balance and increase proprioception to improve the patients ability to perform unlimited ADLs          Billed With/As:   [] TE   [] TA   [] Neuro   [] Self Care Patient Education: [x] Review HEP    [] Progressed/Changed HEP based on:   [] positioning   [] body mechanics   [] transfers   [] heat/ice application    [] other:      Other Objective/Functional Measures:    CGA throughout all strengthening and balance today    Patient notably more off balance today requiring assist to nu step from front of clinic and frequent Mod A to maintain upright. Post Treatment Pain Level (on 0 to 10) scale:   0  / 10     ASSESSMENT  Assessment/Changes in Function:     Patient requires moderate to maximal cuin throughout all exercises for form. Poor overall balance strategies with EC. Patient to bring FWW in to clinic [place] to ensure proper sizing. []  See Progress Note/Recertification   Patient will continue to benefit from skilled PT services to modify and progress therapeutic interventions, address functional mobility deficits, address ROM deficits, address strength deficits, analyze and address soft tissue restrictions, analyze and cue movement patterns, analyze and modify body mechanics/ergonomics, assess and modify postural abnormalities, address imbalance/dizziness and instruct in home and community integration to attain remaining goals.    Progress toward goals / Updated goals:    Slowly progressing towards Jesus Aguirre 15  []  Upgrade activities as tolerated YES Continue plan of care   []  Discharge due to :    []  Other:      Therapist: Suni Clement    Date: 10/18/2019 Time: 4:08 PM     Future Appointments   Date Time Provider Trevor Quinones   10/22/2019  2:30 PM Josue Slater [facility]   10/24/2019  2:30 PM Fly [facility]   10/28/2019  2:00 PM Kyle Vasquez MD [facility]   10/29/2019  2:30 PM Fly Wolfe [facility]   10/31/2019  3:00 PM Kate Cali [facility]

## 2019-10-22 ENCOUNTER — APPOINTMENT (OUTPATIENT)
Dept: PHYSICAL THERAPY | Age: 84
End: 2019-10-22
Payer: MEDICARE

## 2019-10-24 ENCOUNTER — APPOINTMENT (OUTPATIENT)
Dept: PHYSICAL THERAPY | Age: 84
End: 2019-10-24
Payer: MEDICARE

## 2019-10-29 ENCOUNTER — APPOINTMENT (OUTPATIENT)
Dept: PHYSICAL THERAPY | Age: 84
End: 2019-10-29
Payer: MEDICARE

## 2019-10-29 ENCOUNTER — PATIENT OUTREACH (OUTPATIENT)
Dept: FAMILY MEDICINE CLINIC | Age: 84
End: 2019-10-29

## 2019-10-29 PROBLEM — R41.82 ALTERED MENTAL STATUS: Status: ACTIVE | Noted: 2019-10-24

## 2019-10-29 RX ORDER — ACETAZOLAMIDE 250 MG/1
250 TABLET ORAL 2 TIMES DAILY
COMMUNITY

## 2019-10-29 RX ORDER — CEPHALEXIN 500 MG/1
500 CAPSULE ORAL EVERY 12 HOURS
COMMUNITY
Start: 2019-10-28 | End: 2019-10-31

## 2019-10-29 RX ORDER — TIMOLOL MALEATE 5 MG/ML
1 SOLUTION OPHTHALMIC DAILY
Refills: 4 | COMMUNITY
Start: 2019-10-15

## 2019-10-29 NOTE — PROGRESS NOTES
.  Hospital Discharge Follow-Up      Date/Time:  10/29/2019 2:02 PM    Patient was admitted to [facility] on 10/24/2019 and discharged on 10/28/19 for UTI. The physician discharge summary was available at the time of outreach. Patient was contacted within 1 business days of discharge. ACM spoke with daughter one of patients' POA , Mrs Edwar White. Adv directive and POA reviewed with daughter. State's she will bring in what she has. Her sister and her now are POA since the death of their father. Top Challenges reviewed with the provider   Daughter do not think patient has Dementia or Alzheimer, previous hospital admissions has noted as well as Dr Davina Scott. On NAMENDA) 10 mg  2 times a day,ZOLOFT) 25 mg take 100 mg daily, and  (HALDOL) 2 mg PO TABS  3 times a day  As needed for agitation. Dr Davina Scott 2019 notes : Dementia:  On Namenda, Aricept. Doing okay     Method of communication with provider :chart routing    Inpatient RRAT score: 13  Was this a readmission? no   Patient stated reason for the readmission: n/a    Ambulatory Care Manager [facility]) contacted the Daughter Mrs Edwar White by telephone to perform post hospital discharge assessment. Verified name and  with Daughter Mrs Edwar White as identifiers. Provided introduction to self, and explanation of the Ambulatory Care Manager role. Reviewed discharge instructions and red flags with Daughter Mrs Edwar White who verbalized understanding. Daughter Mrs Edwar White given an opportunity to ask questions and does not have any further questions or concerns at this time. The Daughter Mrs Edwar White agrees to contact the PCP office for questions related to their healthcare. ACM provided contact information for future reference. Disease Specific:   N/A    Summary of patient's top problems:  1. New patient for Dr Lauryn Diaz  2.  Daughter/family needs education on DX Dementia      Home Health orders at discharge: Has out patient Physical therapy  [address] [place] [address] [facility]      Durable Medical Equipment ordered/company: has her own walker      Barriers to care? Family education on dementai    Advance Care Planning:   Does patient have an Advance Directive:  Discussed with daughter to bring in [place] and ADV with her to appointment to be added to patient's medical EMR     Medication(s):   New Medications at Discharge: 1. Haldol 2 mg 3 times daily as needed for agitation  2. (KEFLEX) 500 mg PO CAPS  2 times a day for 7 days  Changed Medications at Discharge: None  Discontinued Medications at Discharge:  1. (PERCOCET) 5-325 mg PO TABS      Medication reconciliation was performed with Daughter, Mrs Morales Tsang , who verbalizes understanding of administration of home medications. There were no barriers to obtaining medications identified at this time. Referral to Pharm D needed: no     Current Outpatient Medications   Medication Sig    cephALEXin (KEFLEX) 500 mg capsule Take 500 mg by mouth every twelve (12) hours. Until 10/31/2019    acetaZOLAMIDE (DIAMOX) 250 mg tablet Take 250 mg by mouth two (2) times a day.  brinzolamide-brimonidine 1-0.2 % drps Apply 1 Drop to eye three (3) times daily. In Right eye    timolol (TIMOPTIC-XE) 0.5 % ophthalmic gel-forming Administer 1 Drop to right eye daily.  donepezil (ARICEPT) 10 mg tablet Take 1 Tab by mouth nightly.  amLODIPine (NORVASC) 5 mg tablet Take 1 Tab by mouth daily.  memantine (NAMENDA) 10 mg tablet Take 1 Tab by mouth two (2) times a day.  sertraline (ZOLOFT) 100 mg tablet Take 1 Tab by mouth daily.  mirabegron ER (MYRBETRIQ) 25 mg ER tablet Take 25 mg by mouth daily.  aspirin delayed-release 81 mg tablet Take  by mouth daily. No current facility-administered medications for this visit. There are no discontinued medications.     [facility] follow up appointment(s):   Future Appointments   Date Time Provider Trevor Quinones   11/1/2019 11:30 AM Marilia Shell MD [facility]   11/1/2019  3:30 PM [facility]   11/5/2019  2:00 PM Karen Hewitt PT [facility]   11/11/2019 11:30 AM [facility]   11/14/2019  3:00 PM [facility]   11/19/2019  4:00 PM Brianda [facility]   11/22/2019  3:30 PM [facility]   12/9/2019  1:30 PM Jose Guadalupe Ulloa MD [facility]      Non-[facility] follow up appointment(s): N/A  [facility]:  [address] [place] [address] Attends follow up appointments on schedule      11/1/2019 @ 11:30 Friday with new PCP Laila         Post Hospitalization     Prevent complications post hospitalization.  Returns to baseline activity level.       [facility] and strengthen, balance use of mobility device

## 2019-10-31 ENCOUNTER — APPOINTMENT (OUTPATIENT)
Dept: PHYSICAL THERAPY | Age: 84
End: 2019-10-31
Payer: MEDICARE

## 2019-11-01 ENCOUNTER — OFFICE VISIT (OUTPATIENT)
Dept: FAMILY MEDICINE CLINIC | Age: 84
End: 2019-11-01

## 2019-11-01 ENCOUNTER — APPOINTMENT (OUTPATIENT)
Dept: PHYSICAL THERAPY | Age: 84
End: 2019-11-01

## 2019-11-01 VITALS
OXYGEN SATURATION: 99 % | BODY MASS INDEX: 24.54 KG/M2 | WEIGHT: 125 LBS | TEMPERATURE: 96.9 F | RESPIRATION RATE: 16 BRPM | DIASTOLIC BLOOD PRESSURE: 56 MMHG | SYSTOLIC BLOOD PRESSURE: 108 MMHG | HEART RATE: 63 BPM | HEIGHT: 60 IN

## 2019-11-01 DIAGNOSIS — F33.1 MODERATE EPISODE OF RECURRENT MAJOR DEPRESSIVE DISORDER (HCC): ICD-10-CM

## 2019-11-01 DIAGNOSIS — R54 FRAILTY SYNDROME IN GERIATRIC PATIENT: ICD-10-CM

## 2019-11-01 DIAGNOSIS — F03.90 DEMENTIA WITHOUT BEHAVIORAL DISTURBANCE, UNSPECIFIED DEMENTIA TYPE: Primary | ICD-10-CM

## 2019-11-01 DIAGNOSIS — I10 ESSENTIAL HYPERTENSION: ICD-10-CM

## 2019-11-01 DIAGNOSIS — N39.0 FREQUENT UTI: ICD-10-CM

## 2019-11-01 NOTE — PROGRESS NOTES
[address] [facility]  Primary Care Office Visit - Transition of Care    : 1929   Fatemeh Galloway is a 80 y.o. female presenting for  Chief Complaint   Patient presents with   [facility] Follow Up     for UTI still confused     Lethargy            Assessment/Plan:         ICD-10-CM ICD-9-CM   1. Dementia without behavioral disturbance, unspecified dementia type ([facility] [address] F03.90 294.20   2. Frequent UTI N39.0 599.0   3. Essential hypertension I10 401.9   4. Moderate episode of recurrent major depressive disorder (HCC) F33.1 296.32   5. Frailty syndrome in geriatric patient P62 786     Initial encounter for all of the above. Discussed with dtr that recent decline in health status is likely a \"new baseline\", particularly as things did not improve with tx of UTI, and no reversible etiologies found during recent hospitalization. Reviewed results & findings from hospitalization, including the head CT that did show old infarcts. Dtr is certain that her mother would not want to pursue further life-prolonging care while in this state, and that \"I'm pretty sure she was ready to die a while ago. .. She has lived a long good life, and for her birthday we brought her home to [facility] and she had a great time, although it isn't the 'home' that she longs for. \"  Dtr will talk to her sister, who lives in [facility].  Dtr is amenable to referral to a hospice service in order to change the philosophy of care from \"quantity\" to \"quality\". Made dtr aware that decision for hospice can be reversed if sentiments change. Follow-up PRN. Orders Placed This Encounter    REFERRAL TO HOSPICE     Referral Priority:   Routine     Referral Type:   Hospice     Referral Reason:   Continuity of Care     Number of Visits Requested:   1         This document may have been created with the aid of dictation software. Text may contain errors, particularly phonetic errors.       Reviewed management plan & instructions with patient, who voiced understanding. Adele Beckwith MD  Internal Medicine, Family Medicine & Sports Medicine  11/1/2019    Subjective   History:   Dane Ornelas is a [address] female presenting to address:  Chief Complaint   Patient presents with   [facility] Follow [facility]     for UTI still confused     Lethargy       Admit date: 10/24/2019  Discharge date: 10/28/2019  Tel enc w/ RN NN: 10/29/2019    Discharge Dx: Altered mental status, metabolic encephalopathy, delirium   UTI  Prerenal azotemia, dehydration   Glaucoma  Hypertension  Dementia  Metabolic acidosis, mild, lactate normal     Hospital Course: This is a [address] old female with baseline dementia presenting with altered mentation on the basis of UTI. Urine culture was negative but I believe this to be falsely negative due to antibiotic exposure prior to admission. She was treated here with IVF and IV antibiotics with good improvement. She is back to baseline and is dismissed in fair condition. Patient brought in via wheelchair by daughter Betty Prince"Rosalio who is POA. First appointment with me. Previous PCP = Dr. Mcginnis Samples      Patient:  Denies any pain. Is slightly tearful. Is unable to communicate her concern. \"I don't know how to tell you. \"    Daughter:  Prior to 2 weeks ago, pt living in independent living with aid from 9:30a-1:30p and from 4p-7p, going to PT 2x/week, ambulating with walker. This was her level of independence since moving to [facility] in Feb 2018. Her level of mentation and interaction has been much less since ED visit on 10/21/2019. \"I had no idea a UTI could do this to her. \"  Reports that pt's cognition has been declining slowly, but that this recent change \"was a big step\". Since being home from hospital on 10/28, has had 24/7 aid care at her apartment. Has been sleeping most of the time, and barely eating. Hasn't been lucid the entire time. Isn't really walking at all.   \"I have cancelled all her PT appointments since I don't think it will help anymore. \"      Reports that Yoel Aguirre moved to [place] from [address] [place] [address] in Feb 2018, following the death of her  in Dec 2017. He was in a nursing home at the time, \"and my mom was in independent living, and once she couldn't drive anymore, she actually went to visit him everyday, and arranged a ride. \"    \"She really wouldn't want to live like this. \"    Past Medical History:   Diagnosis Date    Depression     Dry eyes, bilateral     Hypertension      Past Surgical History:   Procedure Laterality Date    HX APPENDECTOMY        reports that she has never smoked. She has never used smokeless tobacco. She reports that she does not drink alcohol or use drugs. Social History     Social History Narrative    Not on file     Social History     Tobacco Use   Smoking Status Never Smoker   Smokeless Tobacco Never Used     Family History   Problem Relation Age of Onset    Hypertension Mother     Hypertension Father      Allergies   Allergen Reactions    Paroxetine Hcl Other (comments)     \"I lost all my energy and all my strength\"    Sulfa (Sulfonamide Antibiotics) Rash    Donepezil Other (comments)     insomnia       Problem List:      Patient Active Problem List    Diagnosis    Altered mental status    Essential hypertension    Moderate episode of recurrent major depressive disorder ([address]    Dementia without behavioral disturbance ([address]    Memory loss    Syncope and collapse    Schatzki's ring    Hyperlipidemia       Medications:     Current Outpatient Medications   Medication Sig    acetaZOLAMIDE (DIAMOX) 250 mg tablet Take 250 mg by mouth two (2) times a day.  brinzolamide-brimonidine 1-0.2 % drps Apply 1 Drop to eye three (3) times daily. In Right eye    timolol (TIMOPTIC-XE) 0.5 % ophthalmic gel-forming Administer 1 Drop to right eye daily.  donepezil (ARICEPT) 10 mg tablet Take 1 Tab by mouth nightly.     amLODIPine (NORVASC) 5 mg tablet Take 1 Tab by mouth daily.  memantine (NAMENDA) 10 mg tablet Take 1 Tab by mouth two (2) times a day.  sertraline (ZOLOFT) 100 mg tablet Take 1 Tab by mouth daily.  mirabegron ER (MYRBETRIQ) 25 mg ER tablet Take 25 mg by mouth daily.  aspirin delayed-release 81 mg tablet Take  by mouth daily. No current facility-administered medications for this visit. Review of Systems:     Review of Systems   Unable to perform ROS: Dementia              Objective   Physical Assessment:   VS:    Vitals:    11/01/19 1147   BP: 108/56   Pulse: 63   Resp: 16   Temp: 96.9 °F (36.1 °C)   TempSrc: Oral   SpO2: 99%   Weight: 125 lb (56.7 kg)   Height: 5' (1.524 m)   PainSc:   0 - No pain     Physical Exam   Constitutional: She appears listless. She appears cachectic. She is sleeping and cooperative. HENT:   Head: Normocephalic and atraumatic. Eyes: EOM are normal.   Cardiovascular: Normal rate, regular rhythm, normal heart sounds and normal pulses. Pulmonary/Chest: Effort normal and breath sounds normal. She has no decreased breath sounds. She has no rhonchi. Musculoskeletal:   Moves all 4 extremities. Sitting slumped in wheelchair. Can barely move BLE against gravity. Neurological: She appears listless. She is disoriented. No cranial nerve deficit or sensory deficit. Psychiatric: Her speech is delayed. She is slowed and withdrawn. She exhibits a depressed mood (tearful). Year = 1964. Doesn't know what city we are in.            Records Review:     Discharge summary:    Admit Date 10/24/2019    Discharge Date 10/28/2019    Patient ID  Debby Aviles  80 y.o.  9/25/1929  Code Status at D/C: DNR    Chief Complaint   Patient presents with    POSSIBLE UTI     Discharge Diagnoses    Altered mental status, metabolic encephalopathy, delirium   UTI  Prerenal azotemia, dehydration   Glaucoma  Hypertension  Dementia  Metabolic acidosis, mild, lactate normal Recent Labs & Imaging:                       CT head without contrast (10/21/2019):    1. No CT evidence of acute intracranial abnormality. Chronic microvascular insufficiency deep white matter and chronic posterior left cerebellar infarct without change.

## 2019-11-01 NOTE — PROGRESS NOTES
Jonell Skiff is a 80 y.o. female (: 1929) presenting to address:    Chief Complaint   Patient presents with   [facility] Follow Up     for UTI still confused     Lethargy       Vitals:    19 1147   BP: 108/56   Pulse: 63   Resp: 16   Temp: 96.9 °F (36.1 °C)   TempSrc: Oral   SpO2: 99%   Weight: 125 lb (56.7 kg)   Height: 5' (1.524 m)   PainSc:   0 - No pain       Hearing/Vision:   No exam data present    Learning Assessment:   No flowsheet data found. Depression Screening:     3 most recent PHQ Screens 3/28/2019   Little interest or pleasure in doing things Not at all   Feeling down, depressed, irritable, or hopeless Not at all   Total Score PHQ 2 0     Fall Risk Assessment:     Fall Risk Assessment, last 12 mths 2019   Able to walk? -   Fall in past 12 months? -   Fall with injury? -   Number of falls in past 12 months 5   Fall Risk Score -     Abuse Screening:   No flowsheet data found. Coordination of Care Questionaire:   1. Have you been to the ER, urgent care clinic since your last visit? Hospitalized since your last visit? Yes hospital for UTI     2. Have you seen or consulted any other health care providers outside of the [address] [place] [address] since your last visit? Include any pap smears or colon screening. NO    Advanced Directive:   1. Do you have an Advanced Directive? NO    2. Would you like information on Advanced Directives?  NO

## 2019-11-03 NOTE — PATIENT INSTRUCTIONS
Hospice: Care Instructions Your Care Instructions Hospice care provides medical treatment to relieve symptoms at the end of life. The goal is to keep you comfortable, not to try to cure you. Hospice care does not speed up or lengthen dying. It focuses on easing pain and other symptoms. Hospice caregivers want to enhance your quality of life. Hospice care also offers emotional help and spiritual support when you are dying. It also helps family members care for a loved one who is dying. Hospice care can help you review your life, say important things to family and friends, and explore spiritual issues. Hospice also helps your family and friends deal with their grief after you die. You can use hospice care if your illness cannot be cured and doctors believe you have no more than 6 months to live. You do not need to be confined to a bed or in a hospital to benefit from this type of care. The hospice team includes nurses, counselors, therapists, social workers, pastors, home health aides, and trained volunteers. You can get care in your own home or in a hospice center. Some hospice workers also go to nursing homes or hospitals. How can you care for yourself at home? · Prepare a list of advance directives. These are instructions to your doctor and family members about what kind of care you want if you become unable to speak or express yourself. · Find out if your health insurance covers hospice care. · Find hospice programs in your area. People who can help include your doctor, your state health department, and your insurance company. · Decide what kinds of hospice services you want. It helps to know what you want before you enter a hospice program. 
· Think about some questions when preparing for hospice care. ? Who do you want to make decisions about your medical care if you are not able to? Many people choose their spouse, child, or doctor. ? What are you most afraid of that might happen? You might be afraid of having pain or losing your independence. Let your hospice team know your fears. The team can help you deal with them. ? Where would you prefer to die? Choices include your home, a hospital, or a nursing home. ? Do you want to donate organs when you die? Make sure that your family clearly understands this. ? Do you want any Latter day rites or practices to be done before you die? Let your hospice team know what you want. Where can you learn more? Go to http://eusebio-ev.info/. Enter K349 in the search box to learn more about \"Hospice: Care Instructions. \" Current as of: April 1, 2019 Content Version: 12.2 © 8677-2630 [employer]. Care instructions adapted under license by [employer] (which disclaims liability or warranty for this information). If you have questions about a medical condition or this instruction, always ask your healthcare professional. [address] any warranty or liability for your use of this information.

## 2019-11-05 ENCOUNTER — APPOINTMENT (OUTPATIENT)
Dept: PHYSICAL THERAPY | Age: 84
End: 2019-11-05

## 2019-11-06 ENCOUNTER — TELEPHONE (OUTPATIENT)
Dept: FAMILY MEDICINE CLINIC | Age: 84
End: 2019-11-06

## 2019-11-06 DIAGNOSIS — N39.0 RECURRENT UTI: Primary | ICD-10-CM

## 2019-11-06 NOTE — TELEPHONE ENCOUNTER
[facility] called back in regards to the pt and I spoke with Veronica about this pt, per Dr Zoie Beckham recommendation (since it seemed like Dr Cindy Sánchez was already gone for the day )  I advised her to  a urine cup and drop of a specimen at the earliest convenience. She voiced understanding of this.

## 2019-11-06 NOTE — TELEPHONE ENCOUNTER
Brina Campbell [employer] [facility] and daughter) called requesting to speak to the nurse in regards to the pt's recent hospital stay, she was seen for a UTI and she has concerns that maybe the antibiotics they gave her wasn't enough and that she might revert and get another bad uti. She wants to know if there would be a way for her to bring a ua sample to be tested here. Please return her call at the earliest convenience.

## 2019-11-07 ENCOUNTER — APPOINTMENT (OUTPATIENT)
Dept: PHYSICAL THERAPY | Age: 84
End: 2019-11-07

## 2019-11-07 NOTE — TELEPHONE ENCOUNTER
Orders Placed This Encounter    CULTURE, URINE     Standing Status:   Future     Standing Expiration Date:   11/7/2020    URINALYSIS W/ RFLX MICROSCOPIC     Standing Status:   Future     Standing Expiration Date:   11/7/2020

## 2019-11-08 ENCOUNTER — APPOINTMENT (OUTPATIENT)
Dept: PHYSICAL THERAPY | Age: 84
End: 2019-11-08

## 2019-11-11 ENCOUNTER — APPOINTMENT (OUTPATIENT)
Dept: PHYSICAL THERAPY | Age: 84
End: 2019-11-11

## 2019-11-14 ENCOUNTER — APPOINTMENT (OUTPATIENT)
Dept: PHYSICAL THERAPY | Age: 84
End: 2019-11-14

## 2019-11-14 ENCOUNTER — TELEPHONE (OUTPATIENT)
Dept: FAMILY MEDICINE CLINIC | Age: 84
End: 2019-11-14

## 2019-11-14 ENCOUNTER — HOSPITAL ENCOUNTER (OUTPATIENT)
Dept: LAB | Age: 84
Discharge: HOME | End: 2019-11-14
Payer: OTHER MISCELLANEOUS

## 2019-11-14 DIAGNOSIS — N39.0 RECURRENT UTI: ICD-10-CM

## 2019-11-14 LAB
APPEARANCE UR: CLEAR
BACTERIA URNS QL MICRO: NEGATIVE /HPF
BILIRUB UR QL: NEGATIVE
COLOR UR: YELLOW
EPITH CASTS URNS QL MICRO: NORMAL /LPF (ref 0–5)
GLUCOSE UR STRIP.AUTO-MCNC: NEGATIVE MG/DL
HGB UR QL STRIP: NEGATIVE
KETONES UR QL STRIP.AUTO: NEGATIVE MG/DL
LEUKOCYTE ESTERASE UR QL STRIP.AUTO: ABNORMAL
NITRITE UR QL STRIP.AUTO: NEGATIVE
PH UR STRIP: 6 [PH] (ref 5–8)
PROT UR STRIP-MCNC: NEGATIVE MG/DL
RBC #/AREA URNS HPF: 0 /HPF (ref 0–5)
SP GR UR REFRACTOMETRY: 1.02 (ref 1–1.03)
UROBILINOGEN UR QL STRIP.AUTO: 1 EU/DL (ref 0.2–1)
WBC URNS QL MICRO: NORMAL /HPF (ref 0–4)

## 2019-11-14 PROCEDURE — 87077 CULTURE AEROBIC IDENTIFY: CPT

## 2019-11-14 PROCEDURE — 81001 URINALYSIS AUTO W/SCOPE: CPT

## 2019-11-14 PROCEDURE — 87086 URINE CULTURE/COLONY COUNT: CPT

## 2019-11-14 PROCEDURE — 87186 SC STD MICRODIL/AGAR DIL: CPT

## 2019-11-15 NOTE — PROGRESS NOTES
Spoke with daughter and informed she states she has been in contact with [facility] and they are working on it.

## 2019-11-15 NOTE — TELEPHONE ENCOUNTER
Notes recorded by Sulma Carrera LPN on [id number]/42/4548 at 8:39 AM EST  Spoke with daughter and informed she states she has been in contact with [facility] and they are working on it.  ------    Notes recorded by Jamari Lomas MD on 11/15/2019 at 7:09 AM EST  Please call Marley Wheeler daughter, and inform her that thus far the urinalysis does not show any infection, however we will still wait on the urine culture. Also please ask if [facility] has made contact with her re: setting up home hospice. Thanks.

## 2019-11-17 LAB
BACTERIA SPEC CULT: ABNORMAL
BACTERIA SPEC CULT: ABNORMAL
SERVICE CMNT-IMP: ABNORMAL

## 2019-11-18 DIAGNOSIS — N30.00 ACUTE CYSTITIS WITHOUT HEMATURIA: Primary | ICD-10-CM

## 2019-11-18 RX ORDER — NITROFURANTOIN (MACROCRYSTALS) 100 MG/1
100 CAPSULE ORAL 2 TIMES DAILY
Qty: 10 CAP | Refills: 0 | Status: SHIPPED | OUTPATIENT
Start: 2019-11-18 | End: 2019-11-23

## 2019-11-18 NOTE — PROGRESS NOTES
Spoke to daughter and she states she still does have symptoms and would like an antibiotic called into pharmacy.

## 2019-11-19 ENCOUNTER — APPOINTMENT (OUTPATIENT)
Dept: PHYSICAL THERAPY | Age: 84
End: 2019-11-19

## 2019-11-22 ENCOUNTER — APPOINTMENT (OUTPATIENT)
Dept: PHYSICAL THERAPY | Age: 84
End: 2019-11-22

## 2020-02-05 NOTE — PROGRESS NOTES
[facility] [address] [place], [place] [address], [address] [place] [address], [address]  Phone: [phone]  Fax: [phone] FOR PHYSICAL THERAPY          Patient Name: Kandy Valerio : 1929   Treatment/Medical Diagnosis: Frequent falls [R29.6]  Gait instability [R26.81]   Onset Date: 2019    Referral Source: Negrito Briceno MD [facility]): 10/3/19   Prior Hospitalization: See Medical History Provider #: [id number]   Prior Level of Function: Previous fall hx   Comorbidities: HTN   Medications: Verified on Patient Summary List   Visits from [facility]: 4 Missed Visits: 3       Key Functional Changes/Progress: Ms. Ceci Moore was last seen on 10/18/19 and her current status is unknown. She had cancelled remaining f/u appointments due to UTI and feeling ill and did not schedule further appointments. Reassessment towards LTG is unable to be made at this time. Assessments/Recommendations: Discontinue therapy due to lack of attendance or compliance. If you have any questions/comments please contact us directly at [phone]. Thank you for allowing us to assist in the care of your patient.     Therapist Signature: Laura Alcazar PT DPT  Date: 2020   Reporting Period: 10/3/19-10/18/19 Time: 7:51 AM